# Patient Record
Sex: MALE | Race: WHITE | NOT HISPANIC OR LATINO | Employment: OTHER | URBAN - METROPOLITAN AREA
[De-identification: names, ages, dates, MRNs, and addresses within clinical notes are randomized per-mention and may not be internally consistent; named-entity substitution may affect disease eponyms.]

---

## 2017-01-31 ENCOUNTER — ALLSCRIPTS OFFICE VISIT (OUTPATIENT)
Dept: OTHER | Facility: OTHER | Age: 67
End: 2017-01-31

## 2017-02-13 RX ORDER — MULTIVITAMIN/IRON/FOLIC ACID 18MG-0.4MG
TABLET ORAL DAILY
COMMUNITY
End: 2018-04-19 | Stop reason: ALTCHOICE

## 2017-02-14 ENCOUNTER — HOSPITAL ENCOUNTER (OUTPATIENT)
Facility: MEDICAL CENTER | Age: 67
Setting detail: OUTPATIENT SURGERY
Discharge: HOME | End: 2017-02-14
Attending: INTERNAL MEDICINE | Admitting: INTERNAL MEDICINE
Payer: COMMERCIAL

## 2017-02-14 ENCOUNTER — ANESTHESIA (OUTPATIENT)
Dept: GASTROENTEROLOGY | Facility: MEDICAL CENTER | Age: 67
End: 2017-02-14
Payer: COMMERCIAL

## 2017-02-14 ENCOUNTER — ANESTHESIA EVENT (OUTPATIENT)
Dept: GASTROENTEROLOGY | Facility: MEDICAL CENTER | Age: 67
End: 2017-02-14
Payer: COMMERCIAL

## 2017-02-14 ENCOUNTER — GENERIC CONVERSION - ENCOUNTER (OUTPATIENT)
Dept: GASTROENTEROLOGY | Facility: MEDICAL CENTER | Age: 67
End: 2017-02-14

## 2017-02-14 VITALS
SYSTOLIC BLOOD PRESSURE: 112 MMHG | HEART RATE: 82 BPM | OXYGEN SATURATION: 97 % | BODY MASS INDEX: 28.92 KG/M2 | DIASTOLIC BLOOD PRESSURE: 75 MMHG | HEIGHT: 70 IN | TEMPERATURE: 98.1 F | RESPIRATION RATE: 16 BRPM | WEIGHT: 202 LBS

## 2017-02-14 DIAGNOSIS — K63.5 POLYP OF COLON: ICD-10-CM

## 2017-02-14 DIAGNOSIS — K59.00 CONSTIPATION: ICD-10-CM

## 2017-02-14 DIAGNOSIS — K62.5 HEMORRHAGE OF ANUS AND RECTUM: ICD-10-CM

## 2017-02-14 PROCEDURE — 88305 TISSUE EXAM BY PATHOLOGIST: CPT | Performed by: INTERNAL MEDICINE

## 2017-02-14 PROCEDURE — 88342 IMHCHEM/IMCYTCHM 1ST ANTB: CPT | Performed by: INTERNAL MEDICINE

## 2017-02-14 RX ORDER — SODIUM CHLORIDE 9 MG/ML
125 INJECTION, SOLUTION INTRAVENOUS CONTINUOUS
Status: DISCONTINUED | OUTPATIENT
Start: 2017-02-14 | End: 2017-02-14 | Stop reason: HOSPADM

## 2017-02-14 RX ORDER — PROPOFOL 10 MG/ML
INJECTION, EMULSION INTRAVENOUS AS NEEDED
Status: DISCONTINUED | OUTPATIENT
Start: 2017-02-14 | End: 2017-02-14 | Stop reason: SURG

## 2017-02-14 RX ADMIN — PROPOFOL 50 MG: 10 INJECTION, EMULSION INTRAVENOUS at 12:45

## 2017-02-14 RX ADMIN — SODIUM CHLORIDE 125 ML/HR: 0.9 INJECTION, SOLUTION INTRAVENOUS at 11:55

## 2017-02-14 RX ADMIN — PROPOFOL 50 MG: 10 INJECTION, EMULSION INTRAVENOUS at 12:58

## 2017-02-14 RX ADMIN — PROPOFOL 50 MG: 10 INJECTION, EMULSION INTRAVENOUS at 12:47

## 2017-02-14 RX ADMIN — PROPOFOL 50 MG: 10 INJECTION, EMULSION INTRAVENOUS at 12:54

## 2017-02-14 RX ADMIN — PROPOFOL 50 MG: 10 INJECTION, EMULSION INTRAVENOUS at 12:51

## 2017-02-14 RX ADMIN — PROPOFOL 150 MG: 10 INJECTION, EMULSION INTRAVENOUS at 12:43

## 2017-03-07 ENCOUNTER — ALLSCRIPTS OFFICE VISIT (OUTPATIENT)
Dept: OTHER | Facility: OTHER | Age: 67
End: 2017-03-07

## 2017-06-01 ENCOUNTER — TRANSCRIBE ORDERS (OUTPATIENT)
Dept: ADMINISTRATIVE | Facility: HOSPITAL | Age: 67
End: 2017-06-01

## 2017-06-01 DIAGNOSIS — H93.13 TINNITUS OF BOTH EARS: ICD-10-CM

## 2017-06-01 DIAGNOSIS — H92.02 LEFT EAR PAIN: ICD-10-CM

## 2017-06-01 DIAGNOSIS — H90.3 SENSORY HEARING LOSS, BILATERAL: Primary | ICD-10-CM

## 2017-06-08 ENCOUNTER — HOSPITAL ENCOUNTER (OUTPATIENT)
Dept: MRI IMAGING | Facility: HOSPITAL | Age: 67
Discharge: HOME | End: 2017-06-08
Attending: SPECIALIST
Payer: COMMERCIAL

## 2017-06-08 DIAGNOSIS — H93.13 TINNITUS OF BOTH EARS: ICD-10-CM

## 2017-06-08 DIAGNOSIS — H92.02 LEFT EAR PAIN: ICD-10-CM

## 2017-06-08 DIAGNOSIS — H90.3 SENSORY HEARING LOSS, BILATERAL: ICD-10-CM

## 2017-06-08 PROCEDURE — 70553 MRI BRAIN STEM W/O & W/DYE: CPT

## 2017-06-08 PROCEDURE — A9585 GADOBUTROL INJECTION: HCPCS | Performed by: SPECIALIST

## 2017-06-08 RX ADMIN — GADOBUTROL 9 ML: 604.72 INJECTION INTRAVENOUS at 09:29

## 2017-07-25 ENCOUNTER — ALLSCRIPTS OFFICE VISIT (OUTPATIENT)
Dept: OTHER | Facility: OTHER | Age: 67
End: 2017-07-25

## 2017-10-06 ENCOUNTER — TRANSCRIBE ORDERS (OUTPATIENT)
Dept: ADMINISTRATIVE | Facility: HOSPITAL | Age: 67
End: 2017-10-06

## 2017-10-06 DIAGNOSIS — M54.16 LUMBAR RADICULOPATHY: Primary | ICD-10-CM

## 2017-10-17 ENCOUNTER — HOSPITAL ENCOUNTER (OUTPATIENT)
Dept: MRI IMAGING | Facility: HOSPITAL | Age: 67
Discharge: HOME | End: 2017-10-17
Payer: COMMERCIAL

## 2017-10-17 DIAGNOSIS — M54.16 LUMBAR RADICULOPATHY: ICD-10-CM

## 2017-10-17 PROCEDURE — 72148 MRI LUMBAR SPINE W/O DYE: CPT

## 2018-01-14 VITALS
HEIGHT: 70 IN | BODY MASS INDEX: 30.1 KG/M2 | WEIGHT: 210.25 LBS | DIASTOLIC BLOOD PRESSURE: 78 MMHG | SYSTOLIC BLOOD PRESSURE: 126 MMHG | TEMPERATURE: 96.1 F | HEART RATE: 74 BPM | OXYGEN SATURATION: 95 %

## 2018-01-14 VITALS
SYSTOLIC BLOOD PRESSURE: 132 MMHG | HEIGHT: 70 IN | HEART RATE: 77 BPM | BODY MASS INDEX: 30.4 KG/M2 | DIASTOLIC BLOOD PRESSURE: 58 MMHG | OXYGEN SATURATION: 98 % | WEIGHT: 212.38 LBS | TEMPERATURE: 95.9 F

## 2018-01-14 VITALS
SYSTOLIC BLOOD PRESSURE: 116 MMHG | BODY MASS INDEX: 29.63 KG/M2 | TEMPERATURE: 97.1 F | WEIGHT: 207 LBS | OXYGEN SATURATION: 98 % | HEART RATE: 81 BPM | DIASTOLIC BLOOD PRESSURE: 80 MMHG | HEIGHT: 70 IN | RESPIRATION RATE: 18 BRPM

## 2018-03-08 RX ORDER — LISINOPRIL 2.5 MG/1
2.5 TABLET ORAL DAILY
COMMUNITY
End: 2019-02-25

## 2018-04-19 ENCOUNTER — OFFICE VISIT (OUTPATIENT)
Dept: CARDIOLOGY CLINIC | Facility: CLINIC | Age: 68
End: 2018-04-19
Payer: COMMERCIAL

## 2018-04-19 VITALS
HEART RATE: 62 BPM | WEIGHT: 196.3 LBS | BODY MASS INDEX: 28.1 KG/M2 | DIASTOLIC BLOOD PRESSURE: 62 MMHG | HEIGHT: 70 IN | SYSTOLIC BLOOD PRESSURE: 120 MMHG

## 2018-04-19 DIAGNOSIS — I34.0 NON-RHEUMATIC MITRAL REGURGITATION: ICD-10-CM

## 2018-04-19 DIAGNOSIS — I25.10 CORONARY ARTERY DISEASE INVOLVING NATIVE HEART WITHOUT ANGINA PECTORIS, UNSPECIFIED VESSEL OR LESION TYPE: Primary | ICD-10-CM

## 2018-04-19 DIAGNOSIS — I10 ESSENTIAL HYPERTENSION: ICD-10-CM

## 2018-04-19 DIAGNOSIS — R06.00 DYSPNEA, UNSPECIFIED TYPE: ICD-10-CM

## 2018-04-19 PROCEDURE — 93000 ELECTROCARDIOGRAM COMPLETE: CPT | Performed by: INTERNAL MEDICINE

## 2018-04-19 PROCEDURE — 99204 OFFICE O/P NEW MOD 45 MIN: CPT | Performed by: INTERNAL MEDICINE

## 2018-04-19 RX ORDER — AMOXICILLIN 500 MG/1
500 CAPSULE ORAL EVERY 8 HOURS SCHEDULED
COMMUNITY
End: 2019-02-25

## 2018-04-19 NOTE — PROGRESS NOTES
Cardiology Follow Up    Luis E Reyes  1950  [id number]  [address] [facility] [address]    1  Coronary artery disease involving native heart without angina pectoris, unspecified vessel or lesion type     2  Essential hypertension     3  Dyspnea, unspecified type  POCT ECG    Echo stress test w contrast if indicated    Lipid panel      History:  71-year-old male with a history of nonobstructive epicardial coronary artery disease on cardiac catheterization approximately 6 years ago in [facility] presents for evaluation of chest discomfort and 2nd opinion and overall cardiovascular care  He tolerates all activity without chest discomfort  Rhythm is within normal limits Twelve lead EKG shows normal sinus He states that he occasionally feels a funny feeling in his chest when driving or resting  It also sometimes occurs after eating  He has mild dyspnea on exertion that is chronic he denies orthopnea paroxysmal nocturnal dyspnea or syncope  Echocardiogram in 2017 showed mild concentric left ventricular hypertrophy with an ejection fraction of 45-50% and apical and septal wall motion abnormality  Apparently this was a new finding  There was also mild-to-moderate mitral regurgitation  There is no problem list on file for this patient  Past Medical History:   Diagnosis Date    Cancer [facility])     malignant neoplasm of prostate    Chronic back pain     Chronic reflux esophagitis     Disease of thyroid gland     nodule of thyroid     Gastric ulcer     Hx of bleeding disorder     rectal bleeding    Malignant neoplasm of prostate (HCC)     Polyp of colon     Rectal bleeding     Thyroid nodule      Social History     Social History    Marital status: /Civil Union     Spouse name: N/A    Number of children: N/A    Years of education: N/A     Occupational History    Not on file  Social History Main Topics    Smoking status: Never Smoker    Smokeless tobacco: Never Used    Alcohol use No    Drug use: No    Sexual activity: Not on file     Other Topics Concern    Not on file     Social History Narrative    No narrative on file      History reviewed  No pertinent family history  Past Surgical History:   Procedure Laterality Date    CERVICAL SPINE SURGERY      HERNIA REPAIR      HERNIA REPAIR      [place] COLONOSCOPY FLX DX W/COLLJ SPEC WHEN PFRMD N/A 2/14/2017    Procedure: EGD AND COLONOSCOPY;  Surgeon: Chuck Mccollum MD;  Location: [facility]; Service: Gastroenterology    PROSTATE SURGERY         Current Outpatient Prescriptions:     amoxicillin (AMOXIL) 500 mg capsule, Take 500 mg by mouth every 8 (eight) hours, Disp: , Rfl:     lisinopril (ZESTRIL) 2 5 mg tablet, Take 2 5 mg by mouth daily, Disp: , Rfl:     metoprolol tartrate (LOPRESSOR) 25 mg tablet, Take 25 mg by mouth every 12 (twelve) hours, Disp: , Rfl:   No Known Allergies    Labs:  No visits with results within 6 Month(s) from this visit  Latest known visit with results is:   Admission on 02/14/2017, Discharged on 02/14/2017   Component Date Value    Case Report 02/14/2017                      Value:Surgical Pathology Report                         Case: [id number]                                   Authorizing Provider:  Chuck Mccollum MD           Collected:           02/14/2017 1246              Ordering Location:     [facility]        Received:            02/15/2017 [address] [place] [address] Endoscopy                                                     Pathologist:           Helio Logan MD                                                           Specimen:    Stomach, Cold bx, gastritis, r/o H  Pylori                                                 Final Diagnosis 02/14/2017                      Value: This result contains rich text formatting which cannot be displayed here   Note 02/14/2017                      Value: This result contains rich text formatting which cannot be displayed here   Additional Information 02/14/2017                      Value: This result contains rich text formatting which cannot be displayed here  Verarobyn Lara Gross Description 02/14/2017                      Value: This result contains rich text formatting which cannot be displayed here  Imaging: No results found  Review of Systems:  Review of Systems   Constitutional: Positive for fatigue  HENT: Negative for nosebleeds  Eyes: Negative for redness  Respiratory: Positive for shortness of breath  Negative for chest tightness  Cardiovascular: Positive for palpitations  Negative for chest pain and leg swelling  Gastrointestinal: Negative for abdominal pain  Endocrine: Negative for polyuria  Genitourinary: Negative for urgency  Musculoskeletal: Positive for arthralgias and back pain  Skin: Negative for rash  Neurological: Positive for dizziness  Negative for syncope  Psychiatric/Behavioral: Negative for confusion and sleep disturbance  The patient is not nervous/anxious  Physical Exam:  Physical Exam   Constitutional: He is oriented to person, place, and time  He appears well-developed and well-nourished  HENT:   Head: Normocephalic and atraumatic  Nose: Nose normal    Mouth/Throat: Oropharynx is clear and moist    Eyes: Pupils are equal, round, and reactive to light  Neck: Neck supple  Cardiovascular: Normal rate, regular rhythm and intact distal pulses  Murmur heard  Systolic murmur is present with a grade of 2/6   Pulmonary/Chest: Effort normal and breath sounds normal    Abdominal: Soft  Bowel sounds are normal    Musculoskeletal: Normal range of motion  Neurological: He is alert and oriented to person, place, and time  Skin: Skin is warm and dry  Psychiatric: He has a normal mood and affect   His behavior is normal  Judgment and thought content normal        Discussion/Summary:  History of nonobstructive coronary artery disease  Atypical chest pain symptoms  Also with known esophageal disease and erosive gastritis on EGD last year  Had an echocardiogram that showed apical and septal wall motion abnormality last year  Currently he is on metoprolol lisinopril  I will order a stress echocardiogram to assess functional capacity re-evaluate LV function and screen for ischemia  I will also check a fasting lipid profile  I will see him back to review these findings and make further recommendations

## 2018-05-14 ENCOUNTER — HOSPITAL ENCOUNTER (OUTPATIENT)
Dept: NON INVASIVE DIAGNOSTICS | Facility: CLINIC | Age: 68
Discharge: HOME | End: 2018-05-14
Payer: COMMERCIAL

## 2018-05-14 ENCOUNTER — APPOINTMENT (OUTPATIENT)
Dept: LAB | Facility: CLINIC | Age: 68
End: 2018-05-14
Payer: COMMERCIAL

## 2018-05-14 DIAGNOSIS — R06.00 DYSPNEA, UNSPECIFIED TYPE: ICD-10-CM

## 2018-05-14 PROCEDURE — 93350 STRESS TTE ONLY: CPT

## 2018-05-15 LAB
MAX DIASTOLIC BP: 76 MMHG
MAX HEART RATE: 151 BPM
MAX PREDICTED HEART RATE: 153 BPM
MAX. SYSTOLIC BP: 184 MMHG
PROTOCOL NAME: NORMAL
TARGET HR FORMULA: NORMAL
TEST INDICATION: NORMAL
TIME IN EXERCISE PHASE: NORMAL

## 2018-05-15 PROCEDURE — 93351 STRESS TTE COMPLETE: CPT | Performed by: INTERNAL MEDICINE

## 2018-05-21 ENCOUNTER — APPOINTMENT (OUTPATIENT)
Dept: LAB | Facility: HOSPITAL | Age: 68
End: 2018-05-21
Attending: INTERNAL MEDICINE
Payer: COMMERCIAL

## 2018-05-21 LAB
CHOLEST SERPL-MCNC: 184 MG/DL (ref 50–200)
HDLC SERPL-MCNC: 87 MG/DL (ref 40–60)
LDLC SERPL CALC-MCNC: 93 MG/DL (ref 0–100)
NONHDLC SERPL-MCNC: 97 MG/DL
TRIGL SERPL-MCNC: 20 MG/DL

## 2018-05-21 PROCEDURE — 80061 LIPID PANEL: CPT | Performed by: INTERNAL MEDICINE

## 2018-05-21 PROCEDURE — 36415 COLL VENOUS BLD VENIPUNCTURE: CPT | Performed by: INTERNAL MEDICINE

## 2018-05-29 RX ORDER — ACETAMINOPHEN AND CODEINE PHOSPHATE 300; 30 MG/1; MG/1
TABLET ORAL
COMMUNITY
Start: 2018-04-18 | End: 2019-02-25

## 2018-05-29 RX ORDER — IBUPROFEN 600 MG/1
TABLET ORAL
COMMUNITY
Start: 2018-04-18 | End: 2019-02-25

## 2018-05-31 ENCOUNTER — OFFICE VISIT (OUTPATIENT)
Dept: CARDIOLOGY CLINIC | Facility: CLINIC | Age: 68
End: 2018-05-31
Payer: COMMERCIAL

## 2018-05-31 VITALS
OXYGEN SATURATION: 98 % | HEIGHT: 70 IN | HEART RATE: 67 BPM | SYSTOLIC BLOOD PRESSURE: 120 MMHG | DIASTOLIC BLOOD PRESSURE: 80 MMHG | BODY MASS INDEX: 27.5 KG/M2 | WEIGHT: 192.1 LBS

## 2018-05-31 DIAGNOSIS — R07.9 CHEST PAIN, UNSPECIFIED TYPE: ICD-10-CM

## 2018-05-31 DIAGNOSIS — I10 ESSENTIAL HYPERTENSION: ICD-10-CM

## 2018-05-31 DIAGNOSIS — I25.10 CORONARY ARTERY DISEASE INVOLVING NATIVE CORONARY ARTERY OF NATIVE HEART WITHOUT ANGINA PECTORIS: Primary | ICD-10-CM

## 2018-05-31 PROCEDURE — 99214 OFFICE O/P EST MOD 30 MIN: CPT | Performed by: INTERNAL MEDICINE

## 2018-05-31 RX ORDER — MULTIVITAMIN
1 TABLET ORAL DAILY
COMMUNITY
End: 2019-02-25

## 2018-05-31 RX ORDER — ASPIRIN 81 MG/1
81 TABLET, CHEWABLE ORAL DAILY
COMMUNITY

## 2018-05-31 NOTE — PROGRESS NOTES
Cardiology Follow Up    [place] Route  1950  [id number]  [address] [facility] [place]  [address] [facility] [address]    1  Coronary artery disease involving native coronary artery of native heart without angina pectoris     2  Chest pain, unspecified type     3  Essential hypertension         Interval History:  He had presented last month with atypical chest pain and mild dyspnea on exertion  The dyspnea is improved  He has occasional stabbing chest pain that comes and goes spontaneously occur which can occur with activity and rest   It only lasts seconds  Iraheta he has no complaints today he denies orthopnea paroxysmal nocturnal dyspnea or syncope    There is no problem list on file for this patient  Past Medical History:   Diagnosis Date    Cancer [facility])     malignant neoplasm of prostate    Chronic back pain     Chronic reflux esophagitis     Disease of thyroid gland     nodule of thyroid     Gastric ulcer     Hx of bleeding disorder     rectal bleeding    Malignant neoplasm of prostate (HCC)     Polyp of colon     Rectal bleeding     Thyroid nodule      Social History     Social History    Marital status: /Civil Union     Spouse name: N/A    Number of children: N/A    Years of education: N/A     Occupational History    Not on file  Social History Main Topics    Smoking status: Never Smoker    Smokeless tobacco: Never Used    Alcohol use No    Drug use: No    Sexual activity: Not on file     Other Topics Concern    Not on file     Social History Narrative    No narrative on file      No family history on file    Past Surgical History:   Procedure Laterality Date    CERVICAL SPINE SURGERY      HERNIA REPAIR      HERNIA REPAIR      [place] COLONOSCOPY FLX DX W/COLLJ SPEC WHEN PFRMD N/A 2/14/2017    Procedure: EGD AND COLONOSCOPY;  Surgeon: Rakesh Reynoso MD;  Location: [facility]; Service: Gastroenterology    PROSTATE SURGERY         Current Outpatient Prescriptions:     acetaminophen-codeine (TYLENOL #3) 300-30 mg per tablet, , Disp: , Rfl:     aspirin 81 mg chewable tablet, Chew 81 mg daily, Disp: , Rfl:     ibuprofen (MOTRIN) 600 mg tablet, , Disp: , Rfl:     lisinopril (ZESTRIL) 2 5 mg tablet, Take 2 5 mg by mouth daily, Disp: , Rfl:     metoprolol tartrate (LOPRESSOR) 25 mg tablet, Take 25 mg by mouth every 12 (twelve) hours, Disp: , Rfl:     Multiple Vitamin (MULTIVITAMIN) tablet, Take 1 tablet by mouth daily, Disp: , Rfl:     amoxicillin (AMOXIL) 500 mg capsule, Take 500 mg by mouth every 8 (eight) hours, Disp: , Rfl:   No Known Allergies    Labs:not applicable  Imaging: No results found  Review of Systems:  Review of Systems   Constitutional: Negative for fatigue  HENT: Negative for nosebleeds  Eyes: Negative for redness  Respiratory: Negative for chest tightness and shortness of breath  Cardiovascular: Positive for chest pain  Negative for palpitations and leg swelling  Gastrointestinal: Negative for abdominal pain  Endocrine: Negative for polyuria  Genitourinary: Negative for urgency  Musculoskeletal: Negative for arthralgias  Skin: Negative for rash  Neurological: Negative for dizziness and syncope  Psychiatric/Behavioral: Negative for confusion and sleep disturbance  The patient is not nervous/anxious  Physical Exam:  Physical Exam   Constitutional: He is oriented to person, place, and time  He appears well-developed and well-nourished  HENT:   Head: Normocephalic and atraumatic  Nose: Nose normal    Mouth/Throat: Oropharynx is clear and moist    Eyes: Pupils are equal, round, and reactive to light  Neck: Neck supple  Cardiovascular: Normal rate, regular rhythm and intact distal pulses  Murmur heard  Systolic murmur is present with a grade of 1/6   Pulmonary/Chest: Effort normal and breath sounds normal    Abdominal: Soft  Bowel sounds are normal    Musculoskeletal: Normal range of motion  Neurological: He is alert and oriented to person, place, and time  Skin: Skin is warm and dry  Psychiatric: He has a normal mood and affect  His behavior is normal  Judgment and thought content normal        Discussion/Summary:  He had stress echocardiography done  He exercised 8 minutes on a Parker protocol  Echocardiography at peak exercise showed normal augmentation of all wall segments and the echocardiographic portion was negative for ischemia  Baseline EF was 55%  I told him his atypical chest pain syndrome is likely noncardiac  His blood pressure is currently controlled on lisinopril and metoprolol  I checked a fasting lipid profile which was excellent with an HDL of 83 and an LDL of less than 100    I will see him again in 1 year

## 2018-07-20 ENCOUNTER — TELEPHONE (OUTPATIENT)
Dept: CARDIOLOGY CLINIC | Facility: CLINIC | Age: 68
End: 2018-07-20

## 2018-07-20 NOTE — TELEPHONE ENCOUNTER
Nancy Parmar called to let you know that he was at [facility] this week on 7/17 for OP foot surgery, and after getting the sedation, his HR went down to 30  The surgery was completed and he was sent to ER and kept at hospital for observation for 24 hours  He had an echo done there that he was told was normal, and no med changes were made  He was told it was probably a reaction to the sedation  He wanted to let you know  Not due for f/u until May, 2019    Pt cb [phone]

## 2019-02-25 ENCOUNTER — OFFICE VISIT (OUTPATIENT)
Dept: GASTROENTEROLOGY | Facility: MEDICAL CENTER | Age: 69
End: 2019-02-25
Payer: COMMERCIAL

## 2019-02-25 VITALS
TEMPERATURE: 98 F | HEIGHT: 70 IN | WEIGHT: 208.4 LBS | DIASTOLIC BLOOD PRESSURE: 84 MMHG | SYSTOLIC BLOOD PRESSURE: 132 MMHG | BODY MASS INDEX: 29.84 KG/M2 | HEART RATE: 71 BPM

## 2019-02-25 DIAGNOSIS — R10.84 GENERALIZED ABDOMINAL PAIN: ICD-10-CM

## 2019-02-25 DIAGNOSIS — K21.9 GASTROESOPHAGEAL REFLUX DISEASE WITHOUT ESOPHAGITIS: Primary | ICD-10-CM

## 2019-02-25 PROCEDURE — 99214 OFFICE O/P EST MOD 30 MIN: CPT | Performed by: INTERNAL MEDICINE

## 2019-02-25 RX ORDER — DIPHENOXYLATE HYDROCHLORIDE AND ATROPINE SULFATE 2.5; .025 MG/1; MG/1
1 TABLET ORAL
COMMUNITY
End: 2019-03-07 | Stop reason: SDUPTHER

## 2019-02-25 RX ORDER — OMEPRAZOLE 20 MG/1
20 CAPSULE, DELAYED RELEASE ORAL DAILY
Qty: 30 CAPSULE | Refills: 5 | Status: SHIPPED | OUTPATIENT
Start: 2019-02-25 | End: 2019-03-07 | Stop reason: CLARIF

## 2019-02-25 NOTE — PROGRESS NOTES
[facility] - Outpatient Follow-up Note  Christy Ear 80 y o  male MRN: [id number]  Encounter: [id number]          ASSESSMENT AND PLAN:      1  Gastroesophageal reflux disease without esophagitis  2  Generalized abdominal pain  I suspect his recent symptoms may have been due to a viral gastroenteritis causing dyspeptic symptoms and perhaps a worsening of his reflux  Another possibility is that his reflux may have worsened and he has again developed reflux esophagitis  Less likely would be gallstones  I will refer him for right upper quadrant ultrasound and start him back on omeprazole 20 milligrams by mouth daily  I will have a follow-up in the office in two months if there is no improvement in his symptoms we may consider repeat endoscopy or CT scan  - US abdomen limited; Future  - omeprazole (PriLOSEC) 20 mg delayed release capsule; Take 1 capsule (20 mg total) by mouth daily  Dispense: 30 capsule; Refill: 5    ______________________________________________________________________    SUBJECTIVE:  He presents for evaluation because of his chronic reflux that has worsened over the past two weeks and new onset of generalized abdominal pain over the past two weeks  He denies any nausea, vomiting, change in bowel habits, bleeding, or weight loss  His last upper endoscopy and colonoscopy in 2017 revealed erosive reflux esophagitis, diverticulosis, gastritis, and hemorrhoids  He was started on omeprazole and felt it helped but then stopped it because he was feeling better  REVIEW OF SYSTEMS IS OTHERWISE NEGATIVE        Historical Information   Past Medical History:   Diagnosis Date    Cancer [facility])     malignant neoplasm of prostate    Chronic back pain     Chronic reflux esophagitis     Disease of thyroid gland     nodule of thyroid     Gastric ulcer     Hx of bleeding disorder     rectal bleeding    Malignant neoplasm of prostate (HCC)     Polyp of colon     Rectal bleeding     Thyroid nodule      Past Surgical History:   Procedure Laterality Date    CERVICAL SPINE SURGERY      FOOT SURGERY Bilateral     HERNIA REPAIR      HERNIA REPAIR      [place] COLONOSCOPY FLX DX W/COLLJ SPEC WHEN PFRMD N/A 2/14/2017    Procedure: EGD AND COLONOSCOPY;  Surgeon: Raj Gary MD;  Location: [facility] GI [facility]; Service: Gastroenterology    PROSTATE SURGERY       Social History   Social History     Substance and Sexual Activity   Alcohol Use No     Social History     Substance and Sexual Activity   Drug Use No     Social History     Tobacco Use   Smoking Status Never Smoker   Smokeless Tobacco Never Used     History reviewed  No pertinent family history  Meds/Allergies       Current Outpatient Medications:     aspirin 81 mg chewable tablet    multivitamin (THERAGRAN) TABS    omeprazole (PriLOSEC) 20 mg delayed release capsule    No Known Allergies        Objective     Blood pressure 132/84, pulse 71, temperature 98 °F (36 7 °C), temperature source Tympanic, height 5' 10" (1 778 m), weight 94 5 kg (208 lb 6 4 oz)  Body mass index is 29 9 kg/m²  PHYSICAL EXAM:      General Appearance:   Alert, cooperative, no distress   HEENT:   Normocephalic, atraumatic, anicteric      Neck:  Supple, symmetrical, trachea midline   Lungs:   Clear to auscultation bilaterally; no rales, rhonchi or wheezing; respirations unlabored    Heart[de-identified]   Regular rate and rhythm; no murmur, rub, or gallop  Abdomen:   Soft, mild periumbilical tenderness, non-distended; normal bowel sounds; no masses, no organomegaly    Genitalia:   Deferred    Rectal:   Deferred    Extremities:  No cyanosis, clubbing or edema    Pulses:  2+ and symmetric    Skin:  No jaundice, rashes, or lesions    Lymph nodes:  No palpable cervical lymphadenopathy        Lab Results:   No visits with results within 1 Day(s) from this visit     Latest known visit with results is:   Hospital Outpatient Visit on 05/14/2018   Component Date Value    Protocol Name 05/14/2018 Karyna Garvin Time In Exercise Phase 05/14/2018 00:08:00     MAX  SYSTOLIC BP 34/29/1060 799     Max Diastolic Bp 51/53/0643 76     Max Heart Rate 05/14/2018 151     Max Predicted Heart Rate 05/14/2018 153     Reason for Termination 05/14/2018                      Value:Target Heart Rate Achieved  Dyspnea/ SOB  Fatigue      Test Indication 05/14/2018 Dyspnea     Target Hr Formular 05/14/2018 (220 - Age)*85%          Radiology Results:   No results found

## 2019-03-03 ENCOUNTER — HOSPITAL ENCOUNTER (OUTPATIENT)
Dept: ULTRASOUND IMAGING | Facility: HOSPITAL | Age: 69
Discharge: HOME | End: 2019-03-03
Attending: INTERNAL MEDICINE
Payer: COMMERCIAL

## 2019-03-03 DIAGNOSIS — R10.84 GENERALIZED ABDOMINAL PAIN: ICD-10-CM

## 2019-03-03 PROCEDURE — 76705 ECHO EXAM OF ABDOMEN: CPT

## 2019-03-06 ENCOUNTER — TELEPHONE (OUTPATIENT)
Dept: GASTROENTEROLOGY | Facility: CLINIC | Age: 69
End: 2019-03-06

## 2019-03-06 DIAGNOSIS — R16.0 LIVER MASS: Primary | ICD-10-CM

## 2019-03-06 RX ORDER — MECLIZINE HYDROCHLORIDE 25 MG/1
TABLET ORAL
COMMUNITY
Start: 2018-12-06 | End: 2019-03-07 | Stop reason: CLARIF

## 2019-03-06 RX ORDER — SUMATRIPTAN 25 MG/1
TABLET, FILM COATED ORAL
COMMUNITY
Start: 2018-12-06 | End: 2019-03-07 | Stop reason: CLARIF

## 2019-03-06 RX ORDER — MELOXICAM 7.5 MG/1
TABLET ORAL
COMMUNITY
Start: 2018-12-04 | End: 2019-03-07 | Stop reason: CLARIF

## 2019-03-06 RX ORDER — METOPROLOL SUCCINATE 25 MG/1
TABLET, EXTENDED RELEASE ORAL
COMMUNITY
Start: 2019-03-01 | End: 2019-03-07 | Stop reason: CLARIF

## 2019-03-06 RX ORDER — ATORVASTATIN CALCIUM 40 MG/1
TABLET, FILM COATED ORAL
COMMUNITY
Start: 2019-03-01 | End: 2019-03-07 | Stop reason: CLARIF

## 2019-03-06 RX ORDER — ALBUTEROL SULFATE 90 UG/1
AEROSOL, METERED RESPIRATORY (INHALATION)
COMMUNITY
Start: 2018-12-06 | End: 2019-03-07 | Stop reason: CLARIF

## 2019-03-06 RX ORDER — TADALAFIL 5 MG/1
TABLET ORAL
COMMUNITY

## 2019-03-06 RX ORDER — ONDANSETRON 4 MG/1
TABLET, FILM COATED ORAL
COMMUNITY
Start: 2018-11-26 | End: 2019-03-07 | Stop reason: CLARIF

## 2019-03-06 NOTE — RESULT ENCOUNTER NOTE
I called him with his results and left a voicemail for him to call me back  Likely a cavernous hemangioma, but will get triple phase CT to confirm

## 2019-03-06 NOTE — TELEPHONE ENCOUNTER
Saintclair Levee Dr Rodolfo Croissant,  I was just about to call Corey Shaver  I saw you ordered his CT scan  Do you need me to relay results to him or did you already speak with him?   Jermain

## 2019-03-06 NOTE — TELEPHONE ENCOUNTER
[facility] pt    Radiology dept called to report significant findings on the pts US results  Tiger text sent

## 2019-03-07 ENCOUNTER — OFFICE VISIT (OUTPATIENT)
Dept: CARDIOLOGY CLINIC | Facility: CLINIC | Age: 69
End: 2019-03-07
Payer: COMMERCIAL

## 2019-03-07 VITALS
BODY MASS INDEX: 30.61 KG/M2 | HEIGHT: 70 IN | SYSTOLIC BLOOD PRESSURE: 138 MMHG | WEIGHT: 213.8 LBS | OXYGEN SATURATION: 98 % | DIASTOLIC BLOOD PRESSURE: 78 MMHG | HEART RATE: 80 BPM

## 2019-03-07 DIAGNOSIS — I10 ESSENTIAL HYPERTENSION: Primary | ICD-10-CM

## 2019-03-07 PROCEDURE — 99215 OFFICE O/P EST HI 40 MIN: CPT | Performed by: NURSE PRACTITIONER

## 2019-03-07 NOTE — PROGRESS NOTES
Cardiology Office Visit   Trino Lutz 80 y o  male MRN: [id number]    HPI  Mr Zack Hale is a 80year old male with a known past medical history of   Patient Active Problem List   Diagnosis    Generalized abdominal pain    Gastroesophageal reflux disease without esophagitis     Mr Zack Hale was seen by Dr Leslie Lobo, PCP, for pre operative clearance  Bebe Huang will be undergoing right foot surgery in the near future  Bebe Huang was found to have and elevated /90, at this office visit  He was instructed to follow up with Cardiology  Today Bebe Huang presents to our office for elevated BP  He is accompanied by his wife  Bebe Huang denies CP, palpitations, dyspnea with minimal or moderate exertion, lightheadedness or dizziness  He admits to ocasional ringing in his ears  He does not take his BP at home  He has gained weight over the winter from calories and has not been exercising  Review of Systems   HENT: Positive for tinnitus  Musculoskeletal: Positive for joint pain  Right foot         Objective:   Vitals: /90, /86  Vitals:    03/07/19 0826   BP: 138/78   BP Location: Right arm   Patient Position: Sitting   Cuff Size: Large   Pulse: 80   SpO2: 98%   Weight: 97 kg (213 lb 12 8 oz)   Height: 5' 10" (1 778 m)     Body mass index is 30 68 kg/m²      Wt Readings from Last 3 Encounters:   02/25/19 94 5 kg (208 lb 6 4 oz)   05/31/18 87 1 kg (192 lb 1 6 oz)   04/19/18 89 kg (196 lb 4 8 oz)         Physical Exam:  GEN: Geena Dyer appears well, alert and oriented x 3, pleasant and cooperative   HEENT: pupils equal, round, and reactive to light; extraocular muscles intact  NECK: supple, no carotid bruits   HEART: regular rhythm, normal S1 and S2, no murmurs, clicks, gallops or rubs    LUNGS: clear to auscultation bilaterally; no wheezes, rales, or rhonchi   ABDOMEN: normal bowel sounds, soft, no tenderness, no distention  EXTREMITIES: peripheral pulses normal; no clubbing, cyanosis, or edema  NEURO: no focal findings   SKIN: normal without suspicious lesions on exposed skin      Current Outpatient Medications:     aspirin 81 mg chewable tablet, Chew 81 mg daily, Disp: , Rfl:     Aspirin-Calcium Carbonate  MG TABS, Take 81 mg by mouth, Disp: , Rfl:     atorvastatin (LIPITOR) 40 mg tablet, , Disp: , Rfl:     meclizine (ANTIVERT) 25 mg tablet,  , Disp: , Rfl:     meloxicam (MOBIC) 7 5 mg tablet,  , Disp: , Rfl:     metoprolol succinate (TOPROL-XL) 25 mg 24 hr tablet, , Disp: , Rfl:     Multiple Vitamin (THERAPEUTIC MULTIVITAMIN PO), Take 1 tablet by mouth, Disp: , Rfl:     multivitamin (THERAGRAN) TABS, Take 1 tablet by mouth, Disp: , Rfl:     omeprazole (PriLOSEC) 20 mg delayed release capsule, Take 1 capsule (20 mg total) by mouth daily, Disp: 30 capsule, Rfl: 5    ondansetron (ZOFRAN) 4 mg tablet,  , Disp: , Rfl:     SUMAtriptan (IMITREX) 25 mg tablet,  , Disp: , Rfl:     tadalafil (CIALIS) 5 MG tablet, daily  , Disp: , Rfl:       Assessment/Plan:   1  HTN- BP in left arm 130/86 and Right arm 140/90- instructed on 2gm sodium diet, reading food labels consuming foods 10% or less per serving of sodium, daily exercise  Home BP monitoring and keeping a log  Intrusted to bring BP log to follow up office visit in 3 months with Dr Maco Mtz  Fasting Lipid profile for pre to be done prior to follow up visit        The Gayla Prasad  3/7/2019  8:23 AM

## 2019-03-07 NOTE — PATIENT INSTRUCTIONS
2gm sodium, low fat, low cholesterol diet    Home BP monitoring, sit for 5 minutes prior to taking BP, keep a log, bring to follow up visit

## 2019-03-11 ENCOUNTER — APPOINTMENT (OUTPATIENT)
Dept: LAB | Facility: HOSPITAL | Age: 69
End: 2019-03-11
Payer: COMMERCIAL

## 2019-03-11 ENCOUNTER — TELEPHONE (OUTPATIENT)
Dept: GASTROENTEROLOGY | Facility: CLINIC | Age: 69
End: 2019-03-11

## 2019-03-11 DIAGNOSIS — K76.9 LIVER LESION: Primary | ICD-10-CM

## 2019-03-11 LAB
CHOLEST SERPL-MCNC: 218 MG/DL (ref 50–200)
HDLC SERPL-MCNC: 74 MG/DL (ref 40–60)
LDLC SERPL CALC-MCNC: 127 MG/DL (ref 0–100)
NONHDLC SERPL-MCNC: 144 MG/DL
TRIGL SERPL-MCNC: 86 MG/DL

## 2019-03-11 PROCEDURE — 80061 LIPID PANEL: CPT | Performed by: NURSE PRACTITIONER

## 2019-03-11 PROCEDURE — 36415 COLL VENOUS BLD VENIPUNCTURE: CPT | Performed by: NURSE PRACTITIONER

## 2019-03-11 NOTE — TELEPHONE ENCOUNTER
Prior authorization for cpt [id number] was started through [facility]  Prior auth for the ct of pelvis was denied  Please call [facility] @ [phone] to complete a peer to peer  Patient is scheduled 3/13/19  Just provide patient's ID # which is [id number]  Thank you!

## 2019-03-12 DIAGNOSIS — K76.9 LIVER LESION: Primary | ICD-10-CM

## 2019-03-12 DIAGNOSIS — R16.0 LIVER MASS: Primary | ICD-10-CM

## 2019-03-12 NOTE — TELEPHONE ENCOUNTER
Called [facility] to confirm the status, spoke with Ruth Ann  She states when you called yesterday you only did a peer to peer for a CT of abdomen  The patient call I sent to you was for a peer to peer for cpt [phone], ct of the abdomen and pelvis  Patient is scheduled for tomorrow, please advise

## 2019-03-12 NOTE — TELEPHONE ENCOUNTER
Can you enter the order for the CT of the abdomen so they do not do both the pelvis and abdomen tomorrow?

## 2019-03-13 ENCOUNTER — TELEPHONE (OUTPATIENT)
Dept: GASTROENTEROLOGY | Facility: AMBULARY SURGERY CENTER | Age: 69
End: 2019-03-13

## 2019-03-13 NOTE — TELEPHONE ENCOUNTER
DR Gisella Vazquez' SPT    T [facility] radiology called to notify the office of pt no show for today's appt

## 2019-03-21 ENCOUNTER — HOSPITAL ENCOUNTER (OUTPATIENT)
Dept: CT IMAGING | Facility: HOSPITAL | Age: 69
Discharge: HOME | End: 2019-03-21
Payer: COMMERCIAL

## 2019-03-21 DIAGNOSIS — K76.9 LIVER LESION: ICD-10-CM

## 2019-03-21 PROCEDURE — 74160 CT ABDOMEN W/CONTRAST: CPT

## 2019-03-21 RX ADMIN — IOHEXOL 100 ML: 350 INJECTION, SOLUTION INTRAVENOUS at 08:09

## 2019-04-25 ENCOUNTER — OFFICE VISIT (OUTPATIENT)
Dept: GASTROENTEROLOGY | Facility: MEDICAL CENTER | Age: 69
End: 2019-04-25
Payer: COMMERCIAL

## 2019-04-25 VITALS
HEIGHT: 70 IN | HEART RATE: 67 BPM | BODY MASS INDEX: 30.64 KG/M2 | DIASTOLIC BLOOD PRESSURE: 80 MMHG | SYSTOLIC BLOOD PRESSURE: 120 MMHG | TEMPERATURE: 97.8 F | WEIGHT: 214 LBS

## 2019-04-25 DIAGNOSIS — K76.9 LIVER LESION: ICD-10-CM

## 2019-04-25 DIAGNOSIS — K21.9 GASTROESOPHAGEAL REFLUX DISEASE WITHOUT ESOPHAGITIS: Primary | ICD-10-CM

## 2019-04-25 DIAGNOSIS — Z12.11 COLON CANCER SCREENING: ICD-10-CM

## 2019-04-25 PROBLEM — R10.84 GENERALIZED ABDOMINAL PAIN: Status: RESOLVED | Noted: 2019-02-25 | Resolved: 2019-04-25

## 2019-04-25 PROCEDURE — 99214 OFFICE O/P EST MOD 30 MIN: CPT | Performed by: INTERNAL MEDICINE

## 2019-05-29 RX ORDER — MELOXICAM 7.5 MG/1
TABLET ORAL
COMMUNITY
Start: 2018-12-04

## 2019-05-29 RX ORDER — ONDANSETRON 4 MG/1
TABLET, FILM COATED ORAL
COMMUNITY
Start: 2018-11-26

## 2019-05-29 RX ORDER — MECLIZINE HYDROCHLORIDE 25 MG/1
TABLET ORAL
COMMUNITY
Start: 2018-12-06

## 2019-05-29 RX ORDER — SUMATRIPTAN 25 MG/1
TABLET, FILM COATED ORAL
COMMUNITY
Start: 2018-12-06

## 2019-05-29 RX ORDER — ALBUTEROL SULFATE 90 UG/1
AEROSOL, METERED RESPIRATORY (INHALATION)
COMMUNITY
Start: 2018-12-06

## 2019-05-30 ENCOUNTER — OFFICE VISIT (OUTPATIENT)
Dept: CARDIOLOGY CLINIC | Facility: CLINIC | Age: 69
End: 2019-05-30
Payer: COMMERCIAL

## 2019-05-30 VITALS
WEIGHT: 217.4 LBS | DIASTOLIC BLOOD PRESSURE: 82 MMHG | SYSTOLIC BLOOD PRESSURE: 120 MMHG | HEART RATE: 74 BPM | HEIGHT: 70 IN | BODY MASS INDEX: 31.12 KG/M2

## 2019-05-30 DIAGNOSIS — I10 ESSENTIAL HYPERTENSION: Primary | ICD-10-CM

## 2019-05-30 DIAGNOSIS — I25.10 CORONARY ARTERY DISEASE INVOLVING NATIVE CORONARY ARTERY OF NATIVE HEART WITHOUT ANGINA PECTORIS: ICD-10-CM

## 2019-05-30 DIAGNOSIS — I49.3 PVC (PREMATURE VENTRICULAR CONTRACTION): ICD-10-CM

## 2019-05-30 PROCEDURE — 93000 ELECTROCARDIOGRAM COMPLETE: CPT | Performed by: INTERNAL MEDICINE

## 2019-05-30 PROCEDURE — 99214 OFFICE O/P EST MOD 30 MIN: CPT | Performed by: INTERNAL MEDICINE

## 2020-01-14 ENCOUNTER — OFFICE VISIT (OUTPATIENT)
Dept: GASTROENTEROLOGY | Facility: MEDICAL CENTER | Age: 70
End: 2020-01-14
Payer: COMMERCIAL

## 2020-01-14 VITALS
TEMPERATURE: 97.8 F | HEIGHT: 70 IN | SYSTOLIC BLOOD PRESSURE: 131 MMHG | WEIGHT: 217 LBS | DIASTOLIC BLOOD PRESSURE: 76 MMHG | HEART RATE: 70 BPM | BODY MASS INDEX: 31.07 KG/M2

## 2020-01-14 DIAGNOSIS — K76.9 LIVER LESION: ICD-10-CM

## 2020-01-14 DIAGNOSIS — Z12.11 COLON CANCER SCREENING: ICD-10-CM

## 2020-01-14 DIAGNOSIS — K21.9 GASTROESOPHAGEAL REFLUX DISEASE WITHOUT ESOPHAGITIS: Primary | ICD-10-CM

## 2020-01-14 PROCEDURE — 99214 OFFICE O/P EST MOD 30 MIN: CPT | Performed by: INTERNAL MEDICINE

## 2020-01-14 NOTE — PROGRESS NOTES
[facility] - Outpatient Follow-up Note  Sangita Harding 77 y o  male MRN: [id number]  Encounter: [id number]          ASSESSMENT AND PLAN:      1  Gastroesophageal reflux disease without esophagitis  I encouraged him to continue the diet and lifestyle modifications detailed on his handout and said he can take Pepcid as needed for breakthrough symptoms  I suspect his episode of nausea and vomiting was due to a viral gastroenteritis since it resolved on its own  I asked him to let me know if the symptoms recur  2  Liver lesion  He most likely has a small liver hemangioma or cyst and no further workup for this is indicated  3  Colon cancer screening  He is due for his next screening colonoscopy in 2022 or sooner if he develops new symptoms  ______________________________________________________________________    SUBJECTIVE:  He presents for follow-up of his reflux, liver hemangioma, and colon cancer screening  He was able to stop his omeprazole and has not had any significant recurrence of reflux symptoms  He had a brief episode a few weeks ago of nausea and vomiting but that resolved on its own  He has not had any abdominal pain, change in bowel habits, bleeding, or weight loss  REVIEW OF SYSTEMS IS OTHERWISE NEGATIVE        Historical Information   Past Medical History:   Diagnosis Date    Cancer [facility])     malignant neoplasm of prostate    Chronic back pain     Chronic reflux esophagitis     Disease of thyroid gland     nodule of thyroid     Gastric ulcer     HTN (hypertension)     Hx of bleeding disorder     rectal bleeding    Malignant neoplasm of prostate (HCC)     Polyp of colon     Rectal bleeding     Thyroid nodule      Past Surgical History:   Procedure Laterality Date    CERVICAL SPINE SURGERY      FOOT SURGERY Bilateral     HERNIA REPAIR      HERNIA REPAIR      [place] COLONOSCOPY FLX DX W/COLLJ SPEC WHEN PFRMD N/A 2/14/2017    Procedure: EGD AND COLONOSCOPY;  Surgeon: Judith De Leon MD;  Location: [facility] GI [facility]; Service: Gastroenterology    PROSTATE SURGERY       Social History   Social History     Substance and Sexual Activity   Alcohol Use No     Social History     Substance and Sexual Activity   Drug Use No     Social History     Tobacco Use   Smoking Status Never Smoker   Smokeless Tobacco Never Used     No family history on file  Meds/Allergies       Current Outpatient Medications:     aspirin 81 mg chewable tablet    Multiple Vitamin (THERAPEUTIC MULTIVITAMIN PO)    tadalafil (CIALIS) 5 MG tablet    albuterol (PROAIR HFA) 90 mcg/act inhaler    meclizine (ANTIVERT) 25 mg tablet    meloxicam (MOBIC) 7 5 mg tablet    ondansetron (ZOFRAN) 4 mg tablet    SUMAtriptan (IMITREX) 25 mg tablet    No Known Allergies        Objective     Blood pressure 131/76, pulse 70, temperature 97 8 °F (36 6 °C), temperature source Tympanic, height 5' 10" (1 778 m), weight 98 4 kg (217 lb)  Body mass index is 31 14 kg/m²  PHYSICAL EXAM:      General Appearance:   Alert, cooperative, no distress   HEENT:   Normocephalic, atraumatic, anicteric      Neck:  Supple, symmetrical, trachea midline   Lungs:   Clear to auscultation bilaterally; no rales, rhonchi or wheezing; respirations unlabored    Heart[de-identified]   Regular rate and rhythm; no murmur, rub, or gallop  Abdomen:   Soft, non-tender, non-distended; normal bowel sounds; no masses, no organomegaly    Genitalia:   Deferred    Rectal:   Deferred    Extremities:  No cyanosis, clubbing or edema    Pulses:  2+ and symmetric    Skin:  No jaundice, rashes, or lesions    Lymph nodes:  No palpable cervical lymphadenopathy        Lab Results:   No visits with results within 1 Day(s) from this visit     Latest known visit with results is:   Office Visit on 03/07/2019   Component Date Value    Cholesterol 03/11/2019 218*    Triglycerides 03/11/2019 86     HDL, Direct 03/11/2019 74*    LDL Calculated 03/11/2019 127*    Non-HDL-Chol (CHOL-HDL) 03/11/2019 144          Radiology Results:   No results found

## 2020-05-14 ENCOUNTER — TELEMEDICINE (OUTPATIENT)
Dept: CARDIOLOGY CLINIC | Facility: CLINIC | Age: 70
End: 2020-05-14
Payer: COMMERCIAL

## 2020-05-14 DIAGNOSIS — I25.10 CORONARY ARTERY DISEASE INVOLVING NATIVE CORONARY ARTERY OF NATIVE HEART WITHOUT ANGINA PECTORIS: Primary | ICD-10-CM

## 2020-05-14 DIAGNOSIS — I34.0 NONRHEUMATIC MITRAL VALVE REGURGITATION: ICD-10-CM

## 2020-05-14 DIAGNOSIS — E78.2 MIXED HYPERLIPIDEMIA: ICD-10-CM

## 2020-05-14 PROCEDURE — 99213 OFFICE O/P EST LOW 20 MIN: CPT | Performed by: INTERNAL MEDICINE

## 2020-05-14 RX ORDER — ATORVASTATIN CALCIUM 10 MG/1
10 TABLET, FILM COATED ORAL DAILY
Qty: 90 TABLET | Refills: 3 | Status: SHIPPED | OUTPATIENT
Start: 2020-05-14 | End: 2021-02-17 | Stop reason: SDUPTHER

## 2020-06-11 ENCOUNTER — APPOINTMENT (OUTPATIENT)
Dept: LAB | Facility: CLINIC | Age: 70
End: 2020-06-11
Payer: COMMERCIAL

## 2020-06-11 ENCOUNTER — HOSPITAL ENCOUNTER (OUTPATIENT)
Dept: NON INVASIVE DIAGNOSTICS | Facility: CLINIC | Age: 70
Discharge: HOME | End: 2020-06-11
Payer: COMMERCIAL

## 2020-06-11 DIAGNOSIS — I34.0 NONRHEUMATIC MITRAL VALVE REGURGITATION: ICD-10-CM

## 2020-06-11 LAB
ALBUMIN SERPL BCP-MCNC: 4 G/DL (ref 3.5–5)
ALP SERPL-CCNC: 92 U/L (ref 46–116)
ALT SERPL W P-5'-P-CCNC: 31 U/L (ref 12–78)
AST SERPL W P-5'-P-CCNC: 23 U/L (ref 5–45)
BILIRUB DIRECT SERPL-MCNC: 0.32 MG/DL (ref 0–0.2)
BILIRUB SERPL-MCNC: 0.95 MG/DL (ref 0.2–1)
CHOLEST SERPL-MCNC: 203 MG/DL (ref 50–200)
HDLC SERPL-MCNC: 74 MG/DL
LDLC SERPL CALC-MCNC: 121 MG/DL (ref 0–100)
NONHDLC SERPL-MCNC: 129 MG/DL
PROT SERPL-MCNC: 7.1 G/DL (ref 6.4–8.2)
TRIGL SERPL-MCNC: 42 MG/DL

## 2020-06-11 PROCEDURE — 80061 LIPID PANEL: CPT | Performed by: INTERNAL MEDICINE

## 2020-06-11 PROCEDURE — 93306 TTE W/DOPPLER COMPLETE: CPT

## 2020-06-11 PROCEDURE — 80076 HEPATIC FUNCTION PANEL: CPT | Performed by: INTERNAL MEDICINE

## 2020-06-11 PROCEDURE — 36415 COLL VENOUS BLD VENIPUNCTURE: CPT | Performed by: INTERNAL MEDICINE

## 2020-06-12 PROCEDURE — 93306 TTE W/DOPPLER COMPLETE: CPT | Performed by: INTERNAL MEDICINE

## 2021-02-17 ENCOUNTER — OFFICE VISIT (OUTPATIENT)
Dept: CARDIOLOGY CLINIC | Facility: CLINIC | Age: 71
End: 2021-02-17
Payer: COMMERCIAL

## 2021-02-17 VITALS
SYSTOLIC BLOOD PRESSURE: 140 MMHG | BODY MASS INDEX: 30.69 KG/M2 | TEMPERATURE: 96.3 F | WEIGHT: 214.4 LBS | HEART RATE: 68 BPM | DIASTOLIC BLOOD PRESSURE: 92 MMHG | HEIGHT: 70 IN

## 2021-02-17 DIAGNOSIS — I25.10 CORONARY ARTERY DISEASE INVOLVING NATIVE CORONARY ARTERY OF NATIVE HEART WITHOUT ANGINA PECTORIS: ICD-10-CM

## 2021-02-17 DIAGNOSIS — E78.2 MIXED HYPERLIPIDEMIA: ICD-10-CM

## 2021-02-17 DIAGNOSIS — I34.0 NONRHEUMATIC MITRAL VALVE REGURGITATION: ICD-10-CM

## 2021-02-17 DIAGNOSIS — I10 ESSENTIAL HYPERTENSION: ICD-10-CM

## 2021-02-17 DIAGNOSIS — I71.2 THORACIC AORTIC ANEURYSM WITHOUT RUPTURE (HCC): Primary | ICD-10-CM

## 2021-02-17 PROBLEM — I71.20 THORACIC AORTIC ANEURYSM WITHOUT RUPTURE: Status: ACTIVE | Noted: 2021-02-17

## 2021-02-17 PROCEDURE — 99214 OFFICE O/P EST MOD 30 MIN: CPT | Performed by: INTERNAL MEDICINE

## 2021-02-17 RX ORDER — ATORVASTATIN CALCIUM 10 MG/1
10 TABLET, FILM COATED ORAL DAILY
Qty: 90 TABLET | Refills: 3 | Status: SHIPPED | OUTPATIENT
Start: 2021-02-17 | End: 2021-02-17 | Stop reason: SDUPTHER

## 2021-02-17 RX ORDER — ATORVASTATIN CALCIUM 10 MG/1
10 TABLET, FILM COATED ORAL DAILY
Qty: 90 TABLET | Refills: 3 | Status: SHIPPED | OUTPATIENT
Start: 2021-02-17

## 2021-02-17 NOTE — PROGRESS NOTES
Cardiology Follow Up    [facility]  1950  [id number]  [address] [facility] [address]  [phone]    1  Thoracic aortic aneurysm without rupture ([place] [address] )  CT chest w contrast   2  Coronary artery disease involving native coronary artery of native heart without angina pectoris  atorvastatin (LIPITOR) 10 mg tablet    DISCONTINUED: atorvastatin (LIPITOR) 10 mg tablet   3  Nonrheumatic mitral valve regurgitation     4  Essential hypertension     5  Mixed hyperlipidemia         Interval History:  Feels well without complaint  Denies chest pain shortness of breath orthopnea paroxysmal nocturnal dyspnea or syncope      Patient Active Problem List   Diagnosis    Gastroesophageal reflux disease without esophagitis    HTN (hypertension)    Liver lesion    Coronary artery disease involving native coronary artery of native heart without angina pectoris    Nonrheumatic mitral valve regurgitation    Mixed hyperlipidemia    Thoracic aortic aneurysm without rupture ([place] [address] )     Past Medical History:   Diagnosis Date    Cancer ([place] [address] )     malignant neoplasm of prostate    Chronic back pain     Chronic reflux esophagitis     Disease of thyroid gland     nodule of thyroid     Gastric ulcer     HTN (hypertension)     Hx of bleeding disorder     rectal bleeding    Malignant neoplasm of prostate ([place] [address] )     Polyp of colon     Rectal bleeding     Thyroid nodule      Social History     Socioeconomic History    Marital status: /Civil Union     Spouse name: Not on file    Number of children: Not on file    Years of education: Not on file    Highest education level: Not on file   Occupational History    Not on file   Social Needs    Financial resource strain: Not on file    Food insecurity     Worry: Not on file     Inability: Not on file    Transportation needs     Medical: Not on file     Non-medical: Not on file   Tobacco Use    Smoking status: Never Smoker    Smokeless tobacco: Never Used   Substance and Sexual Activity    Alcohol use: No    Drug use: No    Sexual activity: Not on file   Lifestyle    Physical activity     Days per week: Not on file     Minutes per session: Not on file    Stress: Not on file   Relationships    Social connections     Talks on phone: Not on file     Gets together: Not on file     Attends Rastafari service: Not on file     Active member of club or organization: Not on file     Attends meetings of clubs or organizations: Not on file     Relationship status: Not on file    Intimate partner violence     Fear of current or ex partner: Not on file     Emotionally abused: Not on file     Physically abused: Not on file     Forced sexual activity: Not on file   Other Topics Concern    Not on file   Social History Narrative    Not on file      History reviewed  No pertinent family history  Past Surgical History:   Procedure Laterality Date    CERVICAL SPINE SURGERY      FOOT SURGERY Bilateral     HERNIA REPAIR      HERNIA REPAIR      [place] COLONOSCOPY FLX DX W/COLLJ SPEC WHEN PFRMD N/A 2/14/2017    Procedure: EGD AND COLONOSCOPY;  Surgeon: Michaela Lopez MD;  Location: [facility] GI LAB; Service: Gastroenterology    PROSTATE SURGERY         Current Outpatient Medications:     aspirin 81 mg chewable tablet, Chew 81 mg daily, Disp: , Rfl:     Multiple Vitamin (THERAPEUTIC MULTIVITAMIN PO), Take 1 tablet by mouth, Disp: , Rfl:     albuterol (PROAIR HFA) 90 mcg/act inhaler,  , Disp: , Rfl:     atorvastatin (LIPITOR) 10 mg tablet, Take 1 tablet (10 mg total) by mouth daily, Disp: 90 tablet, Rfl: 3    meclizine (ANTIVERT) 25 mg tablet,  , Disp: , Rfl:     meloxicam (MOBIC) 7 5 mg tablet,  , Disp: , Rfl:     ondansetron (ZOFRAN) 4 mg tablet,  , Disp: , Rfl:     SUMAtriptan (IMITREX) 25 mg tablet,  , Disp: , Rfl:     tadalafil (CIALIS) 5 MG tablet, daily  , Disp: , Rfl:   No Known Allergies    Labs:  No visits with results within 2 Month(s) from this visit  Latest known visit with results is:   Telemedicine on 05/14/2020   Component Date Value    Total Bilirubin 06/11/2020 0 95     Bilirubin, Direct 06/11/2020 0 32*    Alkaline Phosphatase 06/11/2020 92     AST 06/11/2020 23     ALT 06/11/2020 31     Total Protein 06/11/2020 7 1     Albumin 06/11/2020 4 0     Cholesterol 06/11/2020 203*    Triglycerides 06/11/2020 42     HDL, Direct 06/11/2020 74     LDL Calculated 06/11/2020 121*    Non-HDL-Chol (CHOL-HDL) 06/11/2020 129      Imaging: No results found  Review of Systems:  Review of Systems   Constitutional: Negative for fatigue  HENT: Negative for nosebleeds  Eyes: Negative for redness  Respiratory: Negative for chest tightness and shortness of breath  Cardiovascular: Negative for chest pain, palpitations and leg swelling  Gastrointestinal: Negative for abdominal pain  Endocrine: Negative for polyuria  Genitourinary: Negative for urgency  Musculoskeletal: Negative for arthralgias  Skin: Negative for rash  Neurological: Negative for dizziness and syncope  Psychiatric/Behavioral: Negative for confusion and sleep disturbance  The patient is not nervous/anxious  Physical Exam:  Physical Exam  Constitutional:       Appearance: He is well-developed  HENT:      Head: Normocephalic and atraumatic  Nose: Nose normal    Eyes:      Pupils: Pupils are equal, round, and reactive to light  Neck:      Musculoskeletal: Neck supple  Cardiovascular:      Rate and Rhythm: Normal rate and regular rhythm  Heart sounds: Murmur present  Systolic murmur present with a grade of 1/6  Pulmonary:      Effort: Pulmonary effort is normal       Breath sounds: Normal breath sounds  Abdominal:      General: Bowel sounds are normal       Palpations: Abdomen is soft  Musculoskeletal: Normal range of motion  Skin:     General: Skin is warm and dry  Neurological:      Mental Status: He is alert and oriented to person, place, and time  Psychiatric:         Behavior: Behavior normal          Thought Content: Thought content normal          Judgment: Judgment normal          Discussion/Summary:  History of a catheterization in [place] approximately 10 years ago with nonobstructive epicardial coronary artery disease  Normal stress echo in 2018  Asymptomatic in functional class 1  Twelve lead EKG today in the office is normal   He had been started on statin but discontinued it on his own  While on statin he had an improvement in his LDL  He will reinstitute Lipitor 10 mg daily  He will continue aspirin  He had an echocardiogram in [place] 2017 that showed an anteroseptal wall motion abnormality and mild-to-moderate mitral regurgitation  Repeat echo last June showed normal left ventricular wall motion with mild MR  An aortic aneurysm of 4 4 cm was noted  I will check CT scan to confirm sizing  I will follow-up with this by phone  We discussed his blood pressure being mildly elevated to the potential need for beta-blockade if in fact there is an aortic aneurysm  He told me that his blood pressure was measured at home is always within normal limits  I will await the results of his CT scan and then make further recommendations regarding this  I will see him again in 6 months

## 2021-04-05 ENCOUNTER — TELEPHONE (OUTPATIENT)
Dept: CARDIOLOGY CLINIC | Facility: CLINIC | Age: 71
End: 2021-04-05

## 2021-04-05 DIAGNOSIS — I34.0 NONRHEUMATIC MITRAL VALVE REGURGITATION: ICD-10-CM

## 2021-04-05 DIAGNOSIS — I25.10 CORONARY ARTERY DISEASE INVOLVING NATIVE CORONARY ARTERY OF NATIVE HEART WITHOUT ANGINA PECTORIS: Primary | ICD-10-CM

## 2021-04-05 NOTE — TELEPHONE ENCOUNTER
Patient is scheduled for CT scan on Wednesday and absolutely needs BUN & creatinine  Entered script and called patient - but had to leave a message

## 2021-04-06 NOTE — TELEPHONE ENCOUNTER
I called the patient's mob# several times & left messages asking him to call me to confirm  I called the spouse # but that was actually a business  I called radiology to advise that the patient has NOT responded to my calls but the order for blood work is in his chart

## 2021-04-06 NOTE — TELEPHONE ENCOUNTER
Bruna Rinne called back: he is going to reschedule the CT scan because he didn't play his messages and he was unaware he needed blood work done  He already ate breakfast today

## 2021-04-22 ENCOUNTER — APPOINTMENT (OUTPATIENT)
Dept: LAB | Facility: CLINIC | Age: 71
End: 2021-04-22
Payer: COMMERCIAL

## 2021-04-22 LAB
ANION GAP SERPL CALCULATED.3IONS-SCNC: 4 MMOL/L (ref 4–13)
BUN SERPL-MCNC: 18 MG/DL (ref 5–25)
CALCIUM SERPL-MCNC: 9.6 MG/DL (ref 8.3–10.1)
CHLORIDE SERPL-SCNC: 110 MMOL/L (ref 100–108)
CO2 SERPL-SCNC: 28 MMOL/L (ref 21–32)
CREAT SERPL-MCNC: 0.99 MG/DL (ref 0.6–1.3)
GFR SERPL CREATININE-BSD FRML MDRD: 77 ML/MIN/1.73SQ M
GLUCOSE P FAST SERPL-MCNC: 90 MG/DL (ref 65–99)
POTASSIUM SERPL-SCNC: 4.9 MMOL/L (ref 3.5–5.3)
SODIUM SERPL-SCNC: 142 MMOL/L (ref 136–145)

## 2021-04-22 PROCEDURE — 36415 COLL VENOUS BLD VENIPUNCTURE: CPT

## 2021-04-22 PROCEDURE — 80048 BASIC METABOLIC PNL TOTAL CA: CPT

## 2021-08-06 ENCOUNTER — TELEPHONE (OUTPATIENT)
Dept: CARDIOLOGY CLINIC | Facility: CLINIC | Age: 71
End: 2021-08-06

## 2021-08-06 NOTE — TELEPHONE ENCOUNTER
Called, spoke to pt's wife  They have physician close to home and choose to follow there  She requests this appt cancelled  Will cancel and d/c any recalls

## 2021-09-09 ENCOUNTER — OFFICE VISIT (OUTPATIENT)
Dept: GASTROENTEROLOGY | Facility: CLINIC | Age: 71
End: 2021-09-09
Payer: COMMERCIAL

## 2021-09-09 VITALS
HEART RATE: 85 BPM | HEIGHT: 70 IN | DIASTOLIC BLOOD PRESSURE: 88 MMHG | WEIGHT: 209.4 LBS | BODY MASS INDEX: 29.98 KG/M2 | SYSTOLIC BLOOD PRESSURE: 140 MMHG | TEMPERATURE: 98.3 F

## 2021-09-09 DIAGNOSIS — K21.9 GASTROESOPHAGEAL REFLUX DISEASE WITHOUT ESOPHAGITIS: Primary | ICD-10-CM

## 2021-09-09 DIAGNOSIS — Z12.11 COLON CANCER SCREENING: ICD-10-CM

## 2021-09-09 DIAGNOSIS — K76.9 LIVER LESION: ICD-10-CM

## 2021-09-09 PROCEDURE — 99214 OFFICE O/P EST MOD 30 MIN: CPT | Performed by: INTERNAL MEDICINE

## 2021-09-09 NOTE — PROGRESS NOTES
[facility] - Outpatient Follow-up Note  Yesi Late 80 y o  male MRN: [id number]  Encounter: [id number]          ASSESSMENT AND PLAN:      1  Gastroesophageal reflux disease without esophagitis  I encouraged him to continue the lifestyle and dietary modifications that were outlined in the handout I gave him during his last visit  Fortunately this seems to be helping  He can take famotidine as needed for breakthrough symptoms  2  Liver lesion  He most likely has a small liver cyst or hemangioma and no further workup is indicated  3  Colon cancer screening  He is due for his next screening colonoscopy in 2022  I will have him follow-up in six months and we can schedule him for his colonoscopy at that time  ______________________________________________________________________    SUBJECTIVE:  He presents for follow-up of his reflux and abdominal pain  He feels his reflux has been well controlled with lifestyle and dietary modifications and has not had to take any Pepcid as needed  He had one episode of mild abdominal discomfort and cramping that seem to start in the right side and shift to the left side and that resolved on its own  He has not had any abdominal pain or discomfort in the past few weeks  He denies any change in bowel habits, bleeding, dysphagia, or weight loss  REVIEW OF SYSTEMS IS OTHERWISE NEGATIVE        Historical Information   Past Medical History:   Diagnosis Date    Cancer [facility])     malignant neoplasm of prostate    Chronic back pain     Chronic reflux esophagitis     Disease of thyroid gland     nodule of thyroid     Gastric ulcer     HTN (hypertension)     Hx of bleeding disorder     rectal bleeding    Malignant neoplasm of prostate (HCC)     Polyp of colon     Rectal bleeding     Thyroid nodule      Past Surgical History:   Procedure Laterality Date    CERVICAL SPINE SURGERY      COLONOSCOPY      FOOT SURGERY Bilateral     HERNIA REPAIR      HERNIA REPAIR      [place] COLON[place]COPY FLX DX W/COLLJ SPEC WHEN PFRMD N/A 2/14/2017    Procedure: EGD AND COLONOSCOPY;  Surgeon: Manuela Gasca MD;  Location: [facility] GI LAB; Service: Gastroenterology    PROSTATE SURGERY      UPPER GASTROINTESTINAL ENDOSCOPY       Social History   Social History     Substance and Sexual Activity   Alcohol Use No     Social History     Substance and Sexual Activity   Drug Use No     Social History     Tobacco Use   Smoking Status Never Smoker   Smokeless Tobacco Never Used     History reviewed  No pertinent family history  Meds/Allergies       Current Outpatient Medications:     aspirin 81 mg chewable tablet    Multiple Vitamin (THERAPEUTIC MULTIVITAMIN PO)    albuterol (PROAIR HFA) 90 mcg/act inhaler    atorvastatin (LIPITOR) 10 mg tablet    meclizine (ANTIVERT) 25 mg tablet    meloxicam (MOBIC) 7 5 mg tablet    ondansetron (ZOFRAN) 4 mg tablet    SUMAtriptan (IMITREX) 25 mg tablet    tadalafil (CIALIS) 5 MG tablet    No Known Allergies        Objective     Blood pressure 140/88, pulse 85, temperature 98 3 °F (36 8 °C), temperature source Tympanic, height 5' 10" (1 778 m), weight 95 kg (209 lb 6 4 oz)  Body mass index is 30 05 kg/m²  PHYSICAL EXAM:      General Appearance:   Alert, cooperative, no distress   HEENT:   Normocephalic, atraumatic, anicteric      Neck:  Supple, symmetrical, trachea midline   Lungs:   Clear to auscultation bilaterally; no rales, rhonchi or wheezing; respirations unlabored    Heart[de-identified]   Regular rate and rhythm; no murmur, rub, or gallop  Abdomen:   Soft, non-tender, non-distended; normal bowel sounds; no masses, no organomegaly    Genitalia:   Deferred    Rectal:   Deferred    Extremities:  No cyanosis, clubbing or edema    Pulses:  2+ and symmetric    Skin:  No jaundice, rashes, or lesions    Lymph nodes:  No palpable cervical lymphadenopathy        Lab Results:   No visits with results within 1 Day(s) from this visit  Latest known visit with results is:   Telephone on 04/05/2021   Component Date Value    Sodium 04/22/2021 142     Potassium 04/22/2021 4 9     Chloride 04/22/2021 110*    CO2 04/22/2021 28     ANION GAP 04/22/2021 4     BUN 04/22/2021 18     Creatinine 04/22/2021 0 99     Glucose, Fasting 04/22/2021 90     Calcium 04/22/2021 9 6     eGFR 04/22/2021 77          Radiology Results:   No results found

## 2022-02-22 ENCOUNTER — TELEPHONE (OUTPATIENT)
Dept: GASTROENTEROLOGY | Facility: CLINIC | Age: 72
End: 2022-02-22

## 2022-02-22 ENCOUNTER — TELEMEDICINE (OUTPATIENT)
Dept: GASTROENTEROLOGY | Facility: CLINIC | Age: 72
End: 2022-02-22
Payer: COMMERCIAL

## 2022-02-22 DIAGNOSIS — K62.5 RECTAL BLEEDING: ICD-10-CM

## 2022-02-22 DIAGNOSIS — K21.9 GASTROESOPHAGEAL REFLUX DISEASE WITHOUT ESOPHAGITIS: Primary | ICD-10-CM

## 2022-02-22 DIAGNOSIS — Z86.010 HISTORY OF COLONIC POLYPS: ICD-10-CM

## 2022-02-22 DIAGNOSIS — K76.9 LIVER LESION: ICD-10-CM

## 2022-02-22 PROBLEM — Z86.0100 HISTORY OF COLONIC POLYPS: Status: ACTIVE | Noted: 2022-02-22

## 2022-02-22 PROCEDURE — 99214 OFFICE O/P EST MOD 30 MIN: CPT | Performed by: INTERNAL MEDICINE

## 2022-02-22 RX ORDER — OMEPRAZOLE 20 MG/1
20 CAPSULE, DELAYED RELEASE ORAL DAILY
Qty: 30 CAPSULE | Refills: 3 | Status: SHIPPED | OUTPATIENT
Start: 2022-02-22

## 2022-02-22 NOTE — PROGRESS NOTES
Virtual Regular Visit    Verification of patient location:    Patient is located in the following state in which I hold an active license [place]      Assessment/Plan:    Problem List Items Addressed This Visit        Digestive    Gastroesophageal reflux disease without esophagitis - Primary       Other    Liver lesion    History of colonic polyps        1  Gastroesophageal reflux disease without esophagitis  He has gastroesophageal reflux disease and since his symptoms have worsened with Tums and lifestyle and dietary modifications, I will give him a trial of omeprazole 20 mg daily  - omeprazole (PriLOSEC) 20 mg delayed release capsule; Take 1 capsule (20 mg total) by mouth daily  Dispense: 30 capsule; Refill: 3    2  Liver lesion  He has a history of a tiny liver lesion which was felt to be either a small cyst or a small hemangioma  He does not require further workup of this or monitoring unless he develops new symptoms  3  History of colonic polyps  4  Rectal bleeding  I will schedule for colonoscopy since he is due for surveillance and also because of his rectal bleeding  The bleeding is almost certainly due to hemorrhoids given the intermittent mild nature but I will evaluate for other causes during his upcoming colonoscopy  - Colonoscopy; Future       Reason for visit is   Chief Complaint   Patient presents with    Virtual Regular Visit        Encounter provider Denise Andrews MD    Provider located at [address] [place] [address] [address] [place] [address]  [phone]      Recent Visits  No visits were found meeting these conditions  Showing recent visits within past 7 days and meeting all other requirements  Future Appointments  No visits were found meeting these conditions  Showing future appointments within next 150 days and meeting all other requirements       The patient was identified by name and date of birth  Fco Joy was informed that this is a telemedicine visit and that the visit is being conducted through [address] [employer] and patient was informed that this is a secure, HIPAA-compliant platform  He agrees to proceed     My office door was closed  No one else was in the room  He acknowledged consent and understanding of privacy and security of the video platform  The patient has agreed to participate and understands they can discontinue the visit at any time  Patient is aware this is a billable service  Subjective  Fco Joy is a 80 y o  male presents for follow-up of his reflux  He feels his reflux symptoms have worsened since his last visit and he now takes Tums for 5 times a day and even then he continues to have reflux at times  He denies nausea, vomiting, dysphagia, change in bowel habits, and weight loss  He has a history of colon polyps and his last colonoscopy was in February 2017 and he was asked repeat the procedure in five years  Recently has had some intermittent rectal bleeding occurring with hard stools and even soft stools  The bleeding has been mild and intermittent  Past Medical History:   Diagnosis Date    Cancer [facility])     malignant neoplasm of prostate    Chronic back pain     Chronic reflux esophagitis     Disease of thyroid gland     nodule of thyroid     Gastric ulcer     HTN (hypertension)     Hx of bleeding disorder     rectal bleeding    Malignant neoplasm of prostate (HCC)     Polyp of colon     Rectal bleeding     Thyroid nodule        Past Surgical History:   Procedure Laterality Date    CERVICAL SPINE SURGERY      COLONOSCOPY      FOOT SURGERY Bilateral     HERNIA REPAIR      HERNIA REPAIR      [place] COLONOSCOPY FLX DX W/COLLJ SPEC WHEN PFRMD N/A 2/14/2017    Procedure: EGD AND COLONOSCOPY;  Surgeon: Pelon Pro MD;  Location: [facility] GI LAB;   Service: Gastroenterology    PROSTATE SURGERY      UPPER GASTROINTESTINAL ENDOSCOPY         Current Outpatient Medications   Medication Sig Dispense Refill    albuterol (PROAIR HFA) 90 mcg/act inhaler   (Patient not taking: Reported on 9/9/2021)      aspirin 81 mg chewable tablet Chew 81 mg daily      atorvastatin (LIPITOR) 10 mg tablet Take 1 tablet (10 mg total) by mouth daily (Patient not taking: Reported on 9/9/2021) 90 tablet 3    meclizine (ANTIVERT) 25 mg tablet   (Patient not taking: Reported on 9/9/2021)      meloxicam (MOBIC) 7 5 mg tablet   (Patient not taking: Reported on 9/9/2021)      Multiple Vitamin (THERAPEUTIC MULTIVITAMIN PO) Take 1 tablet by mouth      ondansetron (ZOFRAN) 4 mg tablet   (Patient not taking: Reported on 9/9/2021)      SUMAtriptan (IMITREX) 25 mg tablet   (Patient not taking: Reported on 9/9/2021)      tadalafil (CIALIS) 5 MG tablet daily  (Patient not taking: Reported on 9/9/2021)       No current facility-administered medications for this visit  No Known Allergies    REVIEW OF SYSTEMS:    CONSTITUTIONAL: Denies any fever, chills, rigors, and weight loss  HEENT: No earache or tinnitus  Denies hearing loss or visual disturbances  CARDIOVASCULAR: No chest pain or palpitations  RESPIRATORY: Denies any cough, hemoptysis, shortness of breath or dyspnea on exertion  GASTROINTESTINAL: As noted in the History of Present Illness  GENITOURINARY: No problems with urination  Denies any hematuria or dysuria  NEUROLOGIC: No dizziness or vertigo, denies headaches  MUSCULOSKELETAL: Denies any muscle or joint pain  SKIN: Denies skin rashes or itching  ENDOCRINE: Denies excessive thirst  Denies intolerance to heat or cold  PSYCHOSOCIAL: Denies depression or anxiety  Denies any recent memory loss  PHYSICAL EXAMINATION:  Appearance and vitals taken from home devices    General Appearance:   Alert, cooperative, no distress   HEENT:  Normocephalic, atraumatic, anicteric  Neck supple, symmetrical, trachea midline     Lungs:   Equal chest rise and unlabored breathing, normal effort, no coughing  Cardiovascular:   No visualized JVD  Abdomen:   No abdominal distension  Skin:   No jaundice, rashes, or lesions  Musculoskeletal:   Normal range of motion visualized  Psych:  Normal affect and normal insight  Neuro:  Alert and appropriate  I spent 15 minutes directly with the patient during this visit    VIRTUAL VISIT DISCLAIMER      Danette Blanton verbally agrees to participate in [employer]  Pt is aware that [employer] could be limited without vital signs or the ability to perform a full hands-on physical exam  Riki Dyer understands he or the provider may request at any time to terminate the video visit and request the patient to seek care or treatment in person

## 2022-02-22 NOTE — TELEPHONE ENCOUNTER
Scheduled date of colonoscopy (as of today):05 10 22  Physician performing colonoscopy:DR SPENCER  Location of colonoscopy:[facility]  Bowel prep reviewed with patient:MIRALAX/MAGNESIUM CITRATE  Instructions reviewed with patient by:OFFICE  Clearances: N/A

## 2022-05-10 ENCOUNTER — HOSPITAL ENCOUNTER (OUTPATIENT)
Dept: GASTROENTEROLOGY | Facility: MEDICAL CENTER | Age: 72
Setting detail: OUTPATIENT SURGERY
Discharge: HOME | End: 2022-05-10
Attending: INTERNAL MEDICINE | Admitting: INTERNAL MEDICINE
Payer: COMMERCIAL

## 2022-05-10 ENCOUNTER — ANESTHESIA (OUTPATIENT)
Dept: GASTROENTEROLOGY | Facility: MEDICAL CENTER | Age: 72
End: 2022-05-10

## 2022-05-10 ENCOUNTER — ANESTHESIA EVENT (OUTPATIENT)
Dept: GASTROENTEROLOGY | Facility: MEDICAL CENTER | Age: 72
End: 2022-05-10

## 2022-05-10 VITALS
RESPIRATION RATE: 20 BRPM | BODY MASS INDEX: 31.25 KG/M2 | WEIGHT: 211 LBS | DIASTOLIC BLOOD PRESSURE: 62 MMHG | HEART RATE: 62 BPM | SYSTOLIC BLOOD PRESSURE: 109 MMHG | TEMPERATURE: 97.4 F | OXYGEN SATURATION: 97 % | HEIGHT: 69 IN

## 2022-05-10 DIAGNOSIS — Z86.010 HISTORY OF COLONIC POLYPS: ICD-10-CM

## 2022-05-10 DIAGNOSIS — K62.5 RECTAL BLEEDING: ICD-10-CM

## 2022-05-10 PROCEDURE — 88305 TISSUE EXAM BY PATHOLOGIST: CPT | Performed by: PATHOLOGY

## 2022-05-10 PROCEDURE — 45385 COLONOSCOPY W/LESION REMOVAL: CPT | Performed by: INTERNAL MEDICINE

## 2022-05-10 RX ORDER — PROPOFOL 10 MG/ML
INJECTION, EMULSION INTRAVENOUS AS NEEDED
Status: DISCONTINUED | OUTPATIENT
Start: 2022-05-10 | End: 2022-05-10

## 2022-05-10 RX ORDER — SODIUM CHLORIDE 9 MG/ML
125 INJECTION, SOLUTION INTRAVENOUS CONTINUOUS
Status: DISCONTINUED | OUTPATIENT
Start: 2022-05-10 | End: 2022-05-14 | Stop reason: HOSPADM

## 2022-05-10 RX ADMIN — PROPOFOL 50 MG: 10 INJECTION, EMULSION INTRAVENOUS at 09:52

## 2022-05-10 RX ADMIN — PROPOFOL 100 MG: 10 INJECTION, EMULSION INTRAVENOUS at 09:51

## 2022-05-10 RX ADMIN — PROPOFOL 50 MG: 10 INJECTION, EMULSION INTRAVENOUS at 09:53

## 2022-05-10 RX ADMIN — PROPOFOL 50 MG: 10 INJECTION, EMULSION INTRAVENOUS at 09:56

## 2022-05-10 RX ADMIN — SODIUM CHLORIDE 125 ML/HR: 0.9 INJECTION, SOLUTION INTRAVENOUS at 09:40

## 2022-05-10 RX ADMIN — PROPOFOL 50 MG: 10 INJECTION, EMULSION INTRAVENOUS at 10:06

## 2022-05-10 RX ADMIN — PROPOFOL 50 MG: 10 INJECTION, EMULSION INTRAVENOUS at 09:59

## 2022-05-10 RX ADMIN — PROPOFOL 50 MG: 10 INJECTION, EMULSION INTRAVENOUS at 09:54

## 2022-05-10 RX ADMIN — PROPOFOL 50 MG: 10 INJECTION, EMULSION INTRAVENOUS at 10:02

## 2022-05-10 RX ADMIN — PROPOFOL 50 MG: 10 INJECTION, EMULSION INTRAVENOUS at 09:55

## 2022-05-10 NOTE — DISCHARGE INSTRUCTIONS
Colonoscopy   WHAT YOU NEED TO KNOW:   A colonoscopy is a procedure to examine the inside of your colon (intestine) with a scope  Polyps or tissue growths may have been removed during your colonoscopy  It is normal to feel bloated and to have some abdominal discomfort  You should be passing gas  If you have hemorrhoids or you had polyps removed, you may have a small amount of bleeding  DISCHARGE INSTRUCTIONS:   Seek care immediately if:    You have sudden, severe abdominal pain   You have problems swallowing   You have a large amount of black, sticky bowel movements or blood in your bowel movements   You have sudden trouble breathing   You feel weak, lightheaded, or faint or your heart beats faster than normal for you  Contact your healthcare provider if:    You have a fever and chills   You have nausea or are vomiting   Your abdomen is bloated or feels full and hard   You have abdominal pain   You have black, sticky bowel movements or blood in your bowel movements   You have not had a bowel movement for 3 days after your procedure   You have rash or hives   You have questions or concerns about your procedure  Activity:    Do not lift, strain, or run for 24 hours after your procedure   Rest after your procedure  You have been given medicine to relax you  Do not drive or make important decisions until the day after your procedure  Return to your normal activity as directed   Relieve gas and discomfort from bloating by lying on your right side with a heating pad on your abdomen  You may need to take short walks to help the gas move out  Eat small meals until bloating is relieved  Follow up with your healthcare provider as directed: Write down your questions so you remember to ask them during your visits  If you take a blood thinner, please review the specific instructions from your endoscopist about when you should resume it   These can be found in the Recommendation and Your Medication list sections of this After Visit Summary  Colorectal Polyps   WHAT YOU NEED TO KNOW:   Colorectal polyps are small growths of tissue in the lining of the colon and rectum  Most polyps are hyperplastic polyps and are usually benign (noncancerous)  Certain types of polyps, called adenomatous polyps, may turn into cancer  DISCHARGE INSTRUCTIONS:   Follow up with your healthcare provider or gastroenterologist as directed: You may need to return for more tests, such as another colonoscopy  Write down your questions so you remember to ask them during your visits  Reduce your risk for colorectal polyps:   · Eat a variety of healthy foods:  Healthy foods include fruit, vegetables, whole-grain breads, low-fat dairy products, beans, lean meat, and fish  Ask if you need to be on a special diet  · Maintain a healthy weight:  Ask your healthcare provider if you need to lose weight and how much you need to lose  Ask for help with a weight loss program     · Exercise:  Begin to exercise slowly and do more as you get stronger  Talk with your healthcare provider before you start an exercise program      · Limit alcohol:  Your risk for polyps increases the more you drink  · Do not smoke: If you smoke, it is never too late to quit  Ask for information about how to stop  For support and more information:   · Elzbieta Jang ([facility]) [address], [place] [address]  Phone: 9- [phone] - [phone]  Web Address: www digestive  niddk nih gov    Contact your healthcare provider or gastroenterologist if:   · You have a fever  · You have chills, a cough, or feel weak and achy  · You have abdominal pain that does not go away or gets worse after you take medicine  · Your abdomen is swollen  · You are losing weight without trying  · You have questions or concerns about your condition or care      Seek care immediately or call 911 if:   · You have sudden shortness of breath  · You have a fast heart rate, fast breathing, or are too dizzy to stand up  · You have severe abdominal pain  · You see blood in your bowel movement  © Copyright [address] Information is for End User's use only and may not be sold, redistributed or otherwise used for commercial purposes  All illustrations and images included in CareNotes® are the copyrighted property of [employer]  or [facility] Maryan Noble   The above information is an  only  It is not intended as medical advice for individual conditions or treatments  Talk to your doctor, nurse or pharmacist before following any medical regimen to see if it is safe and effective for you

## 2022-05-10 NOTE — ANESTHESIA POSTPROCEDURE EVALUATION
Post-Op Assessment Note    CV Status:  Stable    Pain management: adequate     Mental Status:  Alert and awake   Hydration Status:  Euvolemic   PONV Controlled:  Controlled   Airway Patency:  Patent      Post Op Vitals Reviewed: Yes      Staff: Anesthesiologist         No complications documented      BP      Temp     Pulse     Resp      SpO2      /62   Pulse 62   Temp (!) 97 4 °F (36 3 °C) (Temporal)   Resp 20   Ht 5' 9" (1 753 m)   Wt 95 7 kg (211 lb)   SpO2 97%   BMI 31 16 kg/m²

## 2022-05-10 NOTE — ANESTHESIA PREPROCEDURE EVALUATION
Procedure:  COLONOSCOPY    Relevant Problems   ANESTHESIA (within normal limits)      CARDIO   (+) Coronary artery disease involving native coronary artery of native heart without angina pectoris   (+) HTN (hypertension)   (+) Mixed hyperlipidemia   (+) Nonrheumatic mitral valve regurgitation   (+) Thoracic aortic aneurysm without rupture (HCC)      GI/HEPATIC   (+) Gastroesophageal reflux disease without esophagitis   (+) Rectal bleeding      NEURO/PSYCH   (+) History of colonic polyps        Physical Exam    Airway    Mallampati score: III  TM Distance: >3 FB  Neck ROM: full     Dental       Cardiovascular  Rhythm: regular, Rate: normal,     Pulmonary  Breath sounds clear to auscultation,     Other Findings        Anesthesia Plan  ASA Score- 3     Anesthesia Type- IV sedation with anesthesia with ASA Monitors  Additional Monitors:   Airway Plan:           Plan Factors-Exercise tolerance (METS): >4 METS  Chart reviewed  Patient summary reviewed  Patient is not a current smoker  Induction- intravenous  Postoperative Plan-     Informed Consent- Anesthetic plan and risks discussed with patient

## 2022-05-10 NOTE — H&P
History and Physical -  [facility]  Deirdre Villanueva 71 y o  male MRN: [id number]                  HPI: Deirdre Villanueva is a 71y o  year old male who presents for colonoscopy due to history of colon polyps and hematochezia  REVIEW OF SYSTEMS: Per the HPI, and otherwise unremarkable  Historical Information   Past Medical History:   Diagnosis Date    Cancer [facility])     malignant neoplasm of prostate    Chronic back pain     Chronic reflux esophagitis     COVID-19     Disease of thyroid gland     nodule of thyroid     Gastric ulcer     GERD (gastroesophageal reflux disease)     Hx of bleeding disorder     rectal bleeding    Hypertension     Malignant neoplasm of prostate (HCC)     Palpitations     Polyp of colon     Rectal bleeding     Sleep apnea     Thyroid nodule      Past Surgical History:   Procedure Laterality Date    BACK SURGERY      CARDIAC CATHETERIZATION      CERVICAL SPINE SURGERY      COLONOSCOPY      FOOT SURGERY Bilateral     HERNIA REPAIR      HERNIA REPAIR      [place] COLONOSCOPY FLX DX W/COLLJ SPEC WHEN PFRMD N/A 2/14/2017    Procedure: EGD AND COLONOSCOPY;  Surgeon: Bernardo Green MD;  Location: [facility] GI LAB; Service: Gastroenterology    PROSTATE SURGERY      UPPER GASTROINTESTINAL ENDOSCOPY       Social History   Social History     Substance and Sexual Activity   Alcohol Use No     Social History     Substance and Sexual Activity   Drug Use No     Social History     Tobacco Use   Smoking Status Never Smoker   Smokeless Tobacco Never Used     History reviewed  No pertinent family history  Meds/Allergies     (Not in a hospital admission)      No Known Allergies    Objective     Blood pressure 143/87, pulse 80, temperature (!) 97 4 °F (36 3 °C), temperature source Temporal, resp  rate 20, height 5' 9" (1 753 m), weight 95 7 kg (211 lb), SpO2 97 %        PHYSICAL EXAM    Gen: NAD  CV: RRR  CHEST: Clear  ABD: soft, NT/ND  EXT: no edema      ASSESSMENT/PLAN:  Erick Chao is a 71y o  year old male who presents for colonoscopy due to history of colon polyps and hematochezia  The patient is stable and optimized for the procedure, we reviewed risk and benefits  Risk include but not limited to infection, bleeding, perforation and missing a lesion

## 2022-05-23 NOTE — RESULT ENCOUNTER NOTE
Polyp was an adenoma (precancerous but no cancer) so repeat colonoscopy in 5 years  This was communicated via [address]

## 2022-09-26 ENCOUNTER — OFFICE VISIT (OUTPATIENT)
Dept: GASTROENTEROLOGY | Facility: CLINIC | Age: 72
End: 2022-09-26
Payer: COMMERCIAL

## 2022-09-26 VITALS
DIASTOLIC BLOOD PRESSURE: 81 MMHG | SYSTOLIC BLOOD PRESSURE: 135 MMHG | TEMPERATURE: 97.5 F | WEIGHT: 220.6 LBS | BODY MASS INDEX: 32.67 KG/M2 | HEIGHT: 69 IN

## 2022-09-26 DIAGNOSIS — Z86.010 HISTORY OF COLONIC POLYPS: ICD-10-CM

## 2022-09-26 DIAGNOSIS — K21.9 GASTROESOPHAGEAL REFLUX DISEASE WITHOUT ESOPHAGITIS: Primary | ICD-10-CM

## 2022-09-26 DIAGNOSIS — K62.5 RECTAL BLEEDING: ICD-10-CM

## 2022-09-26 PROCEDURE — 99214 OFFICE O/P EST MOD 30 MIN: CPT | Performed by: INTERNAL MEDICINE

## 2022-09-26 NOTE — PROGRESS NOTES
[facility] - Outpatient Follow-up Note  Brent Mage 71 y o  male MRN: [id number]  Encounter: [id number]          ASSESSMENT AND PLAN:      1  Gastroesophageal reflux disease without esophagitis  He has reflux symptoms so I encouraged him to take the omeprazole daily  I explained that omeprazole is not a cure and that it is it treatment for the condition of reflux and if he stops taking it the reflux is likely to recur  I will have him continue this daily for three months and give him a handout detailing lifestyle and dietary modifications that should help his symptoms  When he returns in three months we can reassess his symptoms and determine if we want to try stopping the omeprazole or continue indefinitely  2  Rectal bleeding  3  History of colonic polyps  I encouraged him to take MiraLax daily for his constipation as this could help his bleeding as well  His bleeding is likely due to hemorrhoids due to straining  He can adjust the MiraLax dose as needed  ______________________________________________________________________    SUBJECTIVE:  He presents for follow-up after his recent colonoscopy  He had a colon adenoma removed and repeat colonoscopy was recommended in five years  He has had intermittent constipation and sometimes when he is straining he has rectal bleeding  He denies diarrhea, abdominal pain, and weight loss  He feels his reflux symptoms have remained intermittent  He started taking omeprazole and felt better but then he stopped it because he was feeling better  He denies any dysphagia, nausea, or vomiting  REVIEW OF SYSTEMS IS OTHERWISE NEGATIVE        Historical Information   Past Medical History:   Diagnosis Date    CAD (coronary artery disease), native coronary artery     Cancer (HCC)     malignant neoplasm of prostate    Chronic back pain     Chronic reflux esophagitis     COVID-19     Disease of thyroid gland     nodule of thyroid  Gastric ulcer     GERD (gastroesophageal reflux disease)     Heart murmur     mitral valve regurgitation    Hx of bleeding disorder     rectal bleeding    Hyperlipidemia     Hypertension     Malignant neoplasm of prostate (HCC)     Palpitations     Polyp of colon     Rectal bleeding     Sleep apnea     Thoracic aortic aneurysm (HCC)     Thyroid nodule      Past Surgical History:   Procedure Laterality Date    BACK SURGERY      CARDIAC CATHETERIZATION      CERVICAL SPINE SURGERY      COLONOSCOPY      FOOT SURGERY Bilateral     HERNIA REPAIR      HERNIA REPAIR      [place] COLONOSCOPY FLX DX W/COLLJ SPEC WHEN PFRMD N/A 2/14/2017    Procedure: EGD AND COLONOSCOPY;  Surgeon: Ghulam Perez MD;  Location: [facility] GI LAB; Service: Gastroenterology    PROSTATE SURGERY      UPPER GASTROINTESTINAL ENDOSCOPY       Social History   Social History     Substance and Sexual Activity   Alcohol Use No     Social History     Substance and Sexual Activity   Drug Use No     Social History     Tobacco Use   Smoking Status Never Smoker   Smokeless Tobacco Never Used     History reviewed  No pertinent family history  Meds/Allergies       Current Outpatient Medications:     albuterol (PROAIR HFA) 90 mcg/act inhaler    aspirin 81 mg chewable tablet    atorvastatin (LIPITOR) 10 mg tablet    meclizine (ANTIVERT) 25 mg tablet    meloxicam (MOBIC) 7 5 mg tablet    Multiple Vitamin (THERAPEUTIC MULTIVITAMIN PO)    omeprazole (PriLOSEC) 20 mg delayed release capsule    ondansetron (ZOFRAN) 4 mg tablet    SUMAtriptan (IMITREX) 25 mg tablet    tadalafil (CIALIS) 5 MG tablet    No Known Allergies        Objective     Blood pressure 135/81, temperature 97 5 °F (36 4 °C), temperature source Tympanic, height 5' 9" (1 753 m), weight 100 kg (220 lb 9 6 oz)  Body mass index is 32 58 kg/m²        PHYSICAL EXAM:      General Appearance:   Alert, cooperative, no distress   HEENT:   Normocephalic, atraumatic, anicteric    Neck:  Supple, symmetrical, trachea midline   Lungs:   Clear to auscultation bilaterally; no rales, rhonchi or wheezing; respirations unlabored    Heart[de-identified]   Regular rate and rhythm; no murmur, rub, or gallop  Abdomen:   Soft, non-tender, non-distended; normal bowel sounds; no masses, no organomegaly    Genitalia:   Deferred    Rectal:   Deferred    Extremities:  No cyanosis, clubbing or edema    Pulses:  2+ and symmetric    Skin:  No jaundice, rashes, or lesions    Lymph nodes:  No palpable cervical lymphadenopathy        Lab Results:   No visits with results within 1 Day(s) from this visit  Latest known visit with results is:   Hospital Outpatient Visit on 05/10/2022   Component Date Value    Case Report 05/10/2022                      Value:Surgical Pathology Report                         Case: [id number]                                   Authorizing Provider:  Josephine Spain MD           Collected:           05/10/2022 1006              Ordering Location:     [facility]        Received:            05/10/2022 6885                                     [address] Endoscopy                                                     Pathologist:           Alfredo Mack MD                                                                  Specimens:   A) - Large Intestine, Sigmoid Colon, sigmoid colon cold snare                                       B) - Rectum, rectal polyp cold snare                                                       Final Diagnosis 05/10/2022                      Value: This result contains rich text formatting which cannot be displayed here   Additional Information 05/10/2022                      Value: This result contains rich text formatting which cannot be displayed here      Synoptic Checklist 05/10/2022                      Value:                            COLON/RECTUM POLYP FORM - GI - All Specimens Mark Serna Description 05/10/2022                      Value: This result contains rich text formatting which cannot be displayed here  Radiology Results:   No results found

## 2024-01-08 ENCOUNTER — TELEPHONE (OUTPATIENT)
Age: 74
End: 2024-01-08

## 2024-01-11 ENCOUNTER — OFFICE VISIT (OUTPATIENT)
Dept: GASTROENTEROLOGY | Facility: CLINIC | Age: 74
End: 2024-01-11
Payer: COMMERCIAL

## 2024-01-11 VITALS
BODY MASS INDEX: 30.36 KG/M2 | DIASTOLIC BLOOD PRESSURE: 90 MMHG | HEIGHT: 69 IN | SYSTOLIC BLOOD PRESSURE: 130 MMHG | WEIGHT: 205 LBS | TEMPERATURE: 99.1 F

## 2024-01-11 DIAGNOSIS — K59.00 CONSTIPATION, UNSPECIFIED CONSTIPATION TYPE: ICD-10-CM

## 2024-01-11 DIAGNOSIS — Z86.010 HISTORY OF COLONIC POLYPS: ICD-10-CM

## 2024-01-11 DIAGNOSIS — K21.9 GASTROESOPHAGEAL REFLUX DISEASE WITHOUT ESOPHAGITIS: Primary | ICD-10-CM

## 2024-01-11 PROCEDURE — 99214 OFFICE O/P EST MOD 30 MIN: CPT | Performed by: INTERNAL MEDICINE

## 2024-01-11 RX ORDER — BIOTIN 10 MG
1 TABLET ORAL
COMMUNITY

## 2024-01-11 RX ORDER — CEPHALEXIN 500 MG/1
TABLET ORAL
COMMUNITY
Start: 2024-01-09

## 2024-01-11 RX ORDER — LOSARTAN POTASSIUM 100 MG/1
1 TABLET ORAL DAILY
COMMUNITY

## 2024-01-11 NOTE — PATIENT INSTRUCTIONS
Patient was scheduled for Gastric emptying as well as a follow up with Dr. Jordan in [place] for 09/06/2024   At 11:20

## 2024-01-11 NOTE — PROGRESS NOTES
[facility] - Outpatient Follow-up Note  Riki Dyer 73 y.o. male MRN: [id number]  Encounter: [id number]          ASSESSMENT AND PLAN:      1. Gastroesophageal reflux disease without esophagitis  Ax on Cx Constipation  Long-term history of GERD, previously on PPI however pt had completed therapy after he no longer had reflux sx's  Been without PPI or H2A for over one year  Currently complaints appear to be GERD-related in nature (vague abdominal pain, acid reflux, worse after meals) and we will therefore have patient c/w PPI for now and report back in a couple of weeks  Not on any scheduled NSAID for recent BP, no recent opiate use  No red flags such as weight loss, vomitus, blood in stool/vomit, anorexia  Abdominal pain worsening after meals however does raise some suspicion for decreased transit time, therefore will order NM gastric emptying for now  Will have pt schedule for now in case needed, as scheduling may take months  If current abdominal pain does not respond to daily PPI for 2-3 weeks, we will have him go ahead and get study  Pt also recently had some change in stool habitus (small, hard) a/w constipation for 4-5 days about 2 weeks ago. Not currently with constipation and notes regularly stools (once q2-3 days)  Will recommend pt re-start MiraLax at low dose (eg, half a cap) in order to keep BM regular as constipation can also exacerbate abdominal pain    Plan:    Continue with Prilosec 20 every morning 30 minutes before first meal x 2 to 3 weeks and reassess abdominal pain  Restart MiraLAX at half normal dose daily scheduled   Return to clinic in 2 months with SYDNIE to follow-up on current complaints  Proceed with gastric emptying study if abdominal pain not relieved with PPI and stool softener    Orders Placed This Encounter   Procedures   • NM gastric emptying     Standing Status:   Future     Standing Expiration Date:   1/11/2028     Scheduling Instructions:      THIS IS  A 4 HOUR TEST.  No food or drink from midnight the night before the exam. If you are diabetic and take medication please bring it with you. Please bring your insurance cards, a form of photo ID and alist of your medications with you. Arrive 15 minutes prior to your appointment time in order to register. On the day of your test, please bring any prior studies of this area with you that were not performed at a [facility].            To schedule this appointment, please contact Central Scheduling at [phone].     Order Specific Question:   Reason for Exam (FREE TEXT)     Answer:   GERD, recurrent despite PPI use       ______________________________________________________________________    SUBJECTIVE:      Mr Dyer is a 74 y/o man with h/o GERD previously on PPI and colonic tubular adenoma with bloody stools who comes here for ax GI complaint.    Today he is here with lower abdominal pain that 'comes and goes', along with feelings of nausea and occasional reflux when eating certain foods x 1 week. He notices these issues more commonly with spicy foods or foods with tomato sauce, however this can occur with any meal. He does in particular state that he has been having 'low-sodium' soups which never cause any abdominal pain. This pain does not last all-day when present, but can be exacerbated by eating.     He additionally states he had change in stool habitus with smaller and harder stools x 4-5 days about 2 weeks ago. He says this issue has resolved however.    This abodminal pain has not caused him to stop eating, yet he says he usually only has small meals.     He notes that 2 days ago he went to his GP for back pain which turned out to likely be kidney stones (had some hematuria) along with UTI. He was given an abx to take q6h for 7 days, yet he cannot name the abx. His abdominal pain started prior to initiating abx however. Additionally his GP had re-started him on omeprazole 20 qAM as he  mentioned the abdominal pain to her as well.    He was previously on Prilosec, however he has not been on PPI for over a year. He has not tried any antacid, etc for this abdominal pain other than taking scheduled Prilosec.    Denies any associated constipation, blood in stool, vomitus, weight loss, appetite change.    REVIEW OF SYSTEMS IS OTHERWISE NEGATIVE.      Historical Information   Past Medical History:   Diagnosis Date   • CAD (coronary artery disease), native coronary artery    • Cancer (HCC)     malignant neoplasm of prostate   • Chronic back pain    • Chronic reflux esophagitis    • COVID-19    • Disease of thyroid gland     nodule of thyroid    • Gastric ulcer    • GERD (gastroesophageal reflux disease)    • Heart murmur     mitral valve regurgitation   • Hx of bleeding disorder     rectal bleeding   • Hyperlipidemia    • Hypertension    • Malignant neoplasm of prostate (HCC)    • Palpitations    • Polyp of colon    • Rectal bleeding    • Sleep apnea    • Thoracic aortic aneurysm (HCC)    • Thyroid nodule      Past Surgical History:   Procedure Laterality Date   • BACK SURGERY     • CARDIAC CATHETERIZATION     • CERVICAL SPINE SURGERY     • COLONOSCOPY     • FOOT SURGERY Bilateral    • HERNIA REPAIR     • HERNIA REPAIR     • [place] COLONOSCOPY FLX DX W/COLLJ SPEC WHEN PFRMD N/A 2/14/2017    Procedure: EGD AND COLONOSCOPY;  Surgeon: Roger Jordan MD;  Location: [facility] GI LAB;  Service: Gastroenterology   • PROSTATE SURGERY     • UPPER GASTROINTESTINAL ENDOSCOPY       Social History   Social History     Substance and Sexual Activity   Alcohol Use No     Social History     Substance and Sexual Activity   Drug Use No     Social History     Tobacco Use   Smoking Status Never   Smokeless Tobacco Never     History reviewed. No pertinent family history.    Meds/Allergies       Current Outpatient Medications:   •  aspirin 81 mg chewable tablet  •  atorvastatin (LIPITOR) 10 mg tablet  •  Cephalexin 500 MG tablet  •   "losartan (COZAAR) 100 MG tablet  •  Multiple Vitamin (THERAPEUTIC MULTIVITAMIN PO)  •  Multiple Vitamins-Minerals (Multivitamin Adult) CHEW  •  omeprazole (PriLOSEC) 20 mg delayed release capsule  •  albuterol (PROAIR HFA) 90 mcg/act inhaler  •  meclizine (ANTIVERT) 25 mg tablet  •  meloxicam (MOBIC) 7.5 mg tablet  •  ondansetron (ZOFRAN) 4 mg tablet  •  SUMAtriptan (IMITREX) 25 mg tablet  •  tadalafil (CIALIS) 5 MG tablet    No Known Allergies        Objective     Blood pressure 130/90, temperature 99.1 °F (37.3 °C), temperature source Tympanic, height 5' 9\" (1.753 m), weight 93 kg (205 lb). Body mass index is 30.27 kg/m².      PHYSICAL EXAM:      General Appearance:   Alert, cooperative, no distress   HEENT:   Normocephalic, atraumatic, anicteric.     Neck:  Supple, symmetrical, trachea midline   Lungs:   Clear to auscultation bilaterally; no rales, rhonchi or wheezing; respirations unlabored    Heart::   Regular rate and rhythm; no murmur, rub, or gallop.   Abdomen:   Soft, non-tender, non-distended; normal bowel sounds; no masses, no organomegaly    Genitalia:   Deferred    Rectal:   Deferred    Extremities:  No cyanosis, clubbing or edema    Pulses:  2+ and symmetric    Skin:  No jaundice, rashes, or lesions    Lymph nodes:  No palpable cervical lymphadenopathy        Lab Results:   No visits with results within 1 Day(s) from this visit.   Latest known visit with results is:   Hospital Outpatient Visit on 05/10/2022   Component Date Value   • Case Report 05/10/2022                      Value:Surgical Pathology Report                         Case: [id number]                                   Authorizing Provider:  Roger Jordan MD           Collected:           05/10/2022 1006              Ordering Location:     [facility]        Received:            05/10/2022 [address] [place] [address] Endoscopy                                                     Pathologist:           Katrina" MD Zunilda                                                                  Specimens:   A) - Large Intestine, Sigmoid Colon, sigmoid colon cold snare                                       B) - Rectum, rectal polyp cold snare                                                      • Final Diagnosis 05/10/2022                      Value:This result contains rich text formatting which cannot be displayed here.   • Additional Information 05/10/2022                      Value:This result contains rich text formatting which cannot be displayed here.   • Synoptic Checklist 05/10/2022                      Value:                            COLON/RECTUM POLYP FORM - GI - All Specimens                                                                                     :    Adenoma(s)     • Gross Description 05/10/2022                      Value:This result contains rich text formatting which cannot be displayed here.         Radiology Results:   No results found.

## 2024-01-31 ENCOUNTER — NURSE TRIAGE (OUTPATIENT)
Age: 74
End: 2024-01-31

## 2024-01-31 NOTE — TELEPHONE ENCOUNTER
Regarding: stomach pain , not feeling better  ----- Message from Merle Person sent at 1/30/2024  4:04 PM EST -----  Pt called in saying he has been taking the prilosec and has his gastric emptying test on 3/14/24 but is wondering what he should be doing until then as his stomach pain has gotten worse. The doctor told him to call in if he was not improving or getting worse and the pt said he is feeling a little worse. Please reach back out to the pt

## 2024-01-31 NOTE — TELEPHONE ENCOUNTER
Last ov 1/11/24 Dr. Norwood/Dr. Jordan, Procedure colon 5/10/22, Scheduled for GES 03/14/24, current routine f/u 9/6/24    Patient called in as instructed at last office visit to report in if symptoms worsening. He is noting increasing abdominal pain, occurring daily above umbilicus. Patient is taking PPI as instructed, scheduled the GES, following reflux diet, elevates head of bed, does not eat late in evening. I advised I would forward to provider for review/plan. I did mention that PPI may be incrased to twice a day but since not mentioned in last office visit note I advised waiting to receive provider reply.       Reason for Disposition   The patient has reflux    Answer Questions - Initial Assessment - Gerd New  When did your symptoms start: ongoing since visit    How often is this occurring: lately daily     What medication are you currently taking: omprazole in the morning    Have you tried any OTC medications:No    What was the outcome of taking the medication for this symptom: worsened    Any recent changes in your bowel habits: no change, normal bowel movements, no blood    Any new life stressors or diet changes: no    Anything that makes this better: not currently    Have you had any recent blood work, imaging, or procedures: rental ultrasound    Protocols used: GERD

## 2024-02-14 ENCOUNTER — NURSE TRIAGE (OUTPATIENT)
Age: 74
End: 2024-02-14

## 2024-02-14 NOTE — TELEPHONE ENCOUNTER
"Last ov 1/11/24 Dr. Jordan  Procedure colon 5/10/22    Patient is scheduled for GES in March, he is noting abdominal pain starting flank area radiating to below umbilicus, it could be severe at times. This has been occurring for past few weeks and he had an episode this morning where he was doubled over. It has subsided but he still has lingering discomfort. There is no correlation with meals, activity, bowel movements. MR. Dyer did take the omeprazole as ordered for 2-3 weeks and abdominal pain continued. He is currently not on the PPI. Patient requesting testing be ordered to evaluate symptoms (labs/CT) but he is willing to report to ER if you feel it is necessary.      Reason for Disposition   Abdominal pain is a chronic symptom (recurrent or ongoing AND lasting > 4 weeks)    Answer Assessment - Initial Assessment Questions  1. LOCATION: \"Where does it hurt?\"       Flank area to bottom of belly and right now below belly button left  2. RADIATION: \"Does the pain shoot anywhere else?\" (e.g., chest, back)      See above  3. ONSET: \"When did the pain begin?\" (Minutes, hours or days ago)       Started a few weeks ago   4. SUDDEN: \"Gradual or sudden onset?\"      This has been happening but this morning worse than before but subsided currently  5. PATTERN \"Does the pain come and go, or is it constant?\"     - If constant: \"Is it getting better, staying the same, or worsening?\"       (Note: Constant means the pain never goes away completely; most serious pain is constant and it progresses)      - If intermittent: \"How long does it last?\" \"Do you have pain now?\"      (Note: Intermittent means the pain goes away completely between bouts)      Intermittent   6. SEVERITY: \"How bad is the pain?\"  (e.g., Scale 1-10; mild, moderate, or severe)     - MILD (1-3): doesn't interfere with normal activities, abdomen soft and not tender to touch      - MODERATE (4-7): interferes with normal activities or awakens from sleep, tender " "to touch      - SEVERE (8-10): excruciating pain, doubled over, unable to do any normal activities        Has been severe when it occurs can last for a while and decreases  7. RECURRENT SYMPTOM: \"Have you ever had this type of stomach pain before?\" If Yes, ask: \"When was the last time?\" and \"What happened that time?\"       yes  8. CAUSE: \"What do you think is causing the stomach pain?\"      unknown  9. RELIEVING/AGGRAVATING FACTORS: \"What makes it better or worse?\" (e.g., movement, antacids, bowel movement)      Nothing at this time, it just will lessen   10. OTHER SYMPTOMS: \"Has there been any vomiting, diarrhea, constipation, or urine problems?\"        Regular bowel movements, watches what he eats, keeps hydrated    Protocols used: Abdominal Pain - Male-ADULT-[place]    "

## 2024-02-14 NOTE — TELEPHONE ENCOUNTER
Regarding: horrible abdominal pain  ----- Message from Mely August sent at 2/14/2024 10:45 AM EST -----  Patient called and stated he is having horrible abdominal pain and requested to see if he can have a ct scan and blood work please reach out to review symptoms thank you

## 2024-02-24 DIAGNOSIS — R10.84 GENERALIZED ABDOMINAL PAIN: Primary | ICD-10-CM

## 2024-03-02 ENCOUNTER — LAB (OUTPATIENT)
Dept: LAB | Facility: HOSPITAL | Age: 74
End: 2024-03-02
Payer: COMMERCIAL

## 2024-03-02 DIAGNOSIS — R10.84 GENERALIZED ABDOMINAL PAIN: ICD-10-CM

## 2024-03-02 LAB
ALBUMIN SERPL BCP-MCNC: 4.5 G/DL (ref 3.5–5)
ALP SERPL-CCNC: 84 U/L (ref 34–104)
ALT SERPL W P-5'-P-CCNC: 19 U/L (ref 7–52)
ANION GAP SERPL CALCULATED.3IONS-SCNC: 5 MMOL/L
AST SERPL W P-5'-P-CCNC: 20 U/L (ref 13–39)
BILIRUB SERPL-MCNC: 0.83 MG/DL (ref 0.2–1)
BUN SERPL-MCNC: 15 MG/DL (ref 5–25)
CALCIUM SERPL-MCNC: 9.6 MG/DL (ref 8.4–10.2)
CHLORIDE SERPL-SCNC: 105 MMOL/L (ref 96–108)
CO2 SERPL-SCNC: 28 MMOL/L (ref 21–32)
CREAT SERPL-MCNC: 0.92 MG/DL (ref 0.6–1.3)
ERYTHROCYTE [DISTWIDTH] IN BLOOD BY AUTOMATED COUNT: 13.4 % (ref 11.6–15.1)
GFR SERPL CREATININE-BSD FRML MDRD: 82 ML/MIN/1.73SQ M
GLUCOSE P FAST SERPL-MCNC: 101 MG/DL (ref 65–99)
HCT VFR BLD AUTO: 45.2 % (ref 36.5–49.3)
HGB BLD-MCNC: 15 G/DL (ref 12–17)
LIPASE SERPL-CCNC: 33 U/L (ref 11–82)
MCH RBC QN AUTO: 29.8 PG (ref 26.8–34.3)
MCHC RBC AUTO-ENTMCNC: 33.2 G/DL (ref 31.4–37.4)
MCV RBC AUTO: 90 FL (ref 82–98)
PLATELET # BLD AUTO: 190 THOUSANDS/UL (ref 149–390)
PMV BLD AUTO: 9.6 FL (ref 8.9–12.7)
POTASSIUM SERPL-SCNC: 4.9 MMOL/L (ref 3.5–5.3)
PROT SERPL-MCNC: 7 G/DL (ref 6.4–8.4)
RBC # BLD AUTO: 5.04 MILLION/UL (ref 3.88–5.62)
SODIUM SERPL-SCNC: 138 MMOL/L (ref 135–147)
WBC # BLD AUTO: 6.26 THOUSAND/UL (ref 4.31–10.16)

## 2024-03-02 PROCEDURE — 85027 COMPLETE CBC AUTOMATED: CPT

## 2024-03-02 PROCEDURE — 80053 COMPREHEN METABOLIC PANEL: CPT

## 2024-03-02 PROCEDURE — 36415 COLL VENOUS BLD VENIPUNCTURE: CPT

## 2024-03-02 PROCEDURE — 83690 ASSAY OF LIPASE: CPT

## 2024-03-08 ENCOUNTER — HOSPITAL ENCOUNTER (OUTPATIENT)
Dept: CT IMAGING | Facility: HOSPITAL | Age: 74
Discharge: HOME | End: 2024-03-08
Attending: INTERNAL MEDICINE
Payer: COMMERCIAL

## 2024-03-08 DIAGNOSIS — R10.84 GENERALIZED ABDOMINAL PAIN: ICD-10-CM

## 2024-03-08 PROCEDURE — 74177 CT ABD & PELVIS W/CONTRAST: CPT

## 2024-03-08 RX ADMIN — IOHEXOL 100 ML: 350 INJECTION, SOLUTION INTRAVENOUS at 17:03

## 2024-06-20 ENCOUNTER — TELEPHONE (OUTPATIENT)
Dept: GASTROENTEROLOGY | Facility: CLINIC | Age: 74
End: 2024-06-20

## 2024-06-20 NOTE — TELEPHONE ENCOUNTER
I called and lvm for the patient informing them that their appointment with Dr. Jordan on 9/6 will need to be rescheduled since he will not be in.

## 2024-07-08 ENCOUNTER — TELEPHONE (OUTPATIENT)
Age: 74
End: 2024-07-08

## 2024-07-08 NOTE — TELEPHONE ENCOUNTER
Patients GI provider:  Dr. Jordan    Number to return call: [phone]    Reason for call: Jazmin White from [employer] is trying to reach pt.    Scheduled procedure/appointment date if applicable: 7/23/2024

## 2024-07-23 ENCOUNTER — OFFICE VISIT (OUTPATIENT)
Age: 74
End: 2024-07-23
Payer: COMMERCIAL

## 2024-07-23 VITALS
WEIGHT: 212 LBS | OXYGEN SATURATION: 97 % | HEIGHT: 69 IN | HEART RATE: 80 BPM | RESPIRATION RATE: 18 BRPM | BODY MASS INDEX: 31.4 KG/M2

## 2024-07-23 DIAGNOSIS — K21.9 GASTROESOPHAGEAL REFLUX DISEASE WITHOUT ESOPHAGITIS: Primary | ICD-10-CM

## 2024-07-23 DIAGNOSIS — K62.5 RECTAL BLEEDING: ICD-10-CM

## 2024-07-23 DIAGNOSIS — Z86.010 HISTORY OF COLONIC POLYPS: ICD-10-CM

## 2024-07-23 PROCEDURE — 99214 OFFICE O/P EST MOD 30 MIN: CPT | Performed by: INTERNAL MEDICINE

## 2024-07-23 NOTE — PROGRESS NOTES
[facility] - Outpatient Follow-up Note  Riki Dyer 74 y.o. male MRN: [id number]  Encounter: [id number]          ASSESSMENT AND PLAN:       1. Gastroesophageal reflux disease without esophagitis  He has reflux symptoms so I encouraged him to take the omeprazole daily.  I explained that omeprazole is not a cure and that it is it treatment for the condition of reflux and if he stops taking it the reflux is likely to recur.  I will have him continue this daily for three months and give him a handout detailing lifestyle and dietary modifications that should help his symptoms.  He can continue to take omeprazole as needed.     2. Rectal bleeding  3. History of colonic polyps  I encouraged him to take prune juice daily for his constipation as this could help his bleeding as well.  His bleeding is likely due to hemorrhoids due to straining.  He can adjust the MiraLax dose as needed.     ______________________________________________________________________     SUBJECTIVE:  He presents for follow-up after his colonoscopy in 2022.  He had a colon adenoma removed and repeat colonoscopy was recommended in five years.  He has had intermittent constipation and sometimes when he is straining he has rectal bleeding.  The rectal bleeding is mainly when he is straining with hard bowel movements.  He has been taking prune juice for his constipation and he feels it is much better now.  He denies diarrhea, abdominal pain, and weight loss.  He feels his reflux symptoms have remained intermittent.  He has been taking omeprazole daily and he feels it is helping.  He denies any dysphagia, nausea, or vomiting.      REVIEW OF SYSTEMS IS OTHERWISE NEGATIVE.      Historical Information   Past Medical History:   Diagnosis Date    CAD (coronary artery disease), native coronary artery     Cancer (HCC)     malignant neoplasm of prostate    Chronic back pain     Chronic reflux esophagitis     COVID-19     Disease of  "thyroid gland     nodule of thyroid     Gastric ulcer     GERD (gastroesophageal reflux disease)     Heart murmur     mitral valve regurgitation    Hx of bleeding disorder     rectal bleeding    Hyperlipidemia     Hypertension     Malignant neoplasm of prostate (HCC)     Palpitations     Polyp of colon     Rectal bleeding     Sleep apnea     Thoracic aortic aneurysm (HCC)     Thyroid nodule      Past Surgical History:   Procedure Laterality Date    BACK SURGERY      CARDIAC CATHETERIZATION      CERVICAL SPINE SURGERY      COLONOSCOPY      FOOT SURGERY Bilateral     HERNIA REPAIR      HERNIA REPAIR      VA COLONOSCOPY FLX DX W/COLLJ SPEC WHEN PFRMD N/A 2/14/2017    Procedure: EGD AND COLONOSCOPY;  Surgeon: Roger Jordan MD;  Location: [facility] GI [facility];  Service: Gastroenterology    PROSTATE SURGERY      UPPER GASTROINTESTINAL ENDOSCOPY       Social History   Social History     Substance and Sexual Activity   Alcohol Use No     Social History     Substance and Sexual Activity   Drug Use No     Social History     Tobacco Use   Smoking Status Never   Smokeless Tobacco Never     History reviewed. No pertinent family history.    Meds/Allergies       Current Outpatient Medications:     aspirin 81 mg chewable tablet    atorvastatin (LIPITOR) 10 mg tablet    losartan (COZAAR) 100 MG tablet    Multiple Vitamin (THERAPEUTIC MULTIVITAMIN PO)    Multiple Vitamins-Minerals (Multivitamin Adult) CHEW    omeprazole (PriLOSEC) 20 mg delayed release capsule    albuterol (PROAIR HFA) 90 mcg/act inhaler    Cephalexin 500 MG tablet    meclizine (ANTIVERT) 25 mg tablet    meloxicam (MOBIC) 7.5 mg tablet    ondansetron (ZOFRAN) 4 mg tablet    SUMAtriptan (IMITREX) 25 mg tablet    tadalafil (CIALIS) 5 MG tablet    No Known Allergies        Objective     Pulse 80, resp. rate 18, height 5' 9\" (1.753 m), weight 96.2 kg (212 lb), SpO2 97%. Body mass index is 31.31 kg/m².      PHYSICAL EXAM:      General Appearance:   Alert, cooperative, no " distress   HEENT:   Normocephalic, atraumatic, anicteric.     Neck:  Supple, symmetrical, trachea midline   Lungs:   Clear to auscultation bilaterally; no rales, rhonchi or wheezing; respirations unlabored    Heart::   Regular rate and rhythm; no murmur, rub, or gallop.   Abdomen:   Soft, non-tender, non-distended; normal bowel sounds; no masses, no organomegaly    Genitalia:   Deferred    Rectal:   Deferred    Extremities:  No cyanosis, clubbing or edema    Pulses:  2+ and symmetric    Skin:  No jaundice, rashes, or lesions    Lymph nodes:  No palpable cervical lymphadenopathy        Lab Results:   No visits with results within 1 Day(s) from this visit.   Latest known visit with results is:   Lab on 03/02/2024   Component Date Value    WBC 03/02/2024 6.26     RBC 03/02/2024 5.04     Hemoglobin 03/02/2024 15.0     Hematocrit 03/02/2024 45.2     MCV 03/02/2024 90     MCH 03/02/2024 29.8     MCHC 03/02/2024 33.2     RDW 03/02/2024 13.4     Platelets 03/02/2024 190     MPV 03/02/2024 9.6     Sodium 03/02/2024 138     Potassium 03/02/2024 4.9     Chloride 03/02/2024 105     CO2 03/02/2024 28     ANION GAP 03/02/2024 5     BUN 03/02/2024 15     Creatinine 03/02/2024 0.92     Glucose, Fasting 03/02/2024 101 (H)     Calcium 03/02/2024 9.6     AST 03/02/2024 20     ALT 03/02/2024 19     Alkaline Phosphatase 03/02/2024 84     Total Protein 03/02/2024 7.0     Albumin 03/02/2024 4.5     Total Bilirubin 03/02/2024 0.83     eGFR 03/02/2024 82     Lipase 03/02/2024 33          Radiology Results:   No results found.

## 2024-07-23 NOTE — LETTER
July 23, 2024     Riki Jolley MD  [address] [facility] [address]    Patient: Riki Dyer   YOB: 1950   Date of Visit: 7/23/2024       Dear Dr. Jolley:    Thank you for referring Riki Dyer to me for evaluation. Below are my notes for this consultation.    If you have questions, please do not hesitate to call me. I look forward to following your patient along with you.         Sincerely,        Roger Jordan MD        CC: No Recipients    Roger Jordan MD  7/23/2024 12:22 PM  Sign when Signing Visit  [facility] - Outpatient Follow-up Note  Riki Dyer 74 y.o. male MRN: [id number]  Encounter: [id number]          ASSESSMENT AND PLAN:       1. Gastroesophageal reflux disease without esophagitis  He has reflux symptoms so I encouraged him to take the omeprazole daily.  I explained that omeprazole is not a cure and that it is it treatment for the condition of reflux and if he stops taking it the reflux is likely to recur.  I will have him continue this daily for three months and give him a handout detailing lifestyle and dietary modifications that should help his symptoms.  He can continue to take omeprazole as needed.     2. Rectal bleeding  3. History of colonic polyps  I encouraged him to take prune juice daily for his constipation as this could help his bleeding as well.  His bleeding is likely due to hemorrhoids due to straining.  He can adjust the MiraLax dose as needed.     ______________________________________________________________________     SUBJECTIVE:  He presents for follow-up after his colonoscopy in 2022.  He had a colon adenoma removed and repeat colonoscopy was recommended in five years.  He has had intermittent constipation and sometimes when he is straining he has rectal bleeding.  The rectal bleeding is mainly when he is straining with hard bowel movements.  He has been taking prune juice for his constipation and he feels it is much better now.   He denies diarrhea, abdominal pain, and weight loss.  He feels his reflux symptoms have remained intermittent.  He has been taking omeprazole daily and he feels it is helping.  He denies any dysphagia, nausea, or vomiting.      REVIEW OF SYSTEMS IS OTHERWISE NEGATIVE.      Historical Information  Past Medical History:   Diagnosis Date   • CAD (coronary artery disease), native coronary artery    • Cancer (HCC)     malignant neoplasm of prostate   • Chronic back pain    • Chronic reflux esophagitis    • COVID-19    • Disease of thyroid gland     nodule of thyroid    • Gastric ulcer    • GERD (gastroesophageal reflux disease)    • Heart murmur     mitral valve regurgitation   • Hx of bleeding disorder     rectal bleeding   • Hyperlipidemia    • Hypertension    • Malignant neoplasm of prostate (HCC)    • Palpitations    • Polyp of colon    • Rectal bleeding    • Sleep apnea    • Thoracic aortic aneurysm (HCC)    • Thyroid nodule      Past Surgical History:   Procedure Laterality Date   • BACK SURGERY     • CARDIAC CATHETERIZATION     • CERVICAL SPINE SURGERY     • COLONOSCOPY     • FOOT SURGERY Bilateral    • HERNIA REPAIR     • HERNIA REPAIR     • [place] COLONOSCOPY FLX DX W/COLLJ SPEC WHEN PFRMD N/A 2/14/2017    Procedure: EGD AND COLONOSCOPY;  Surgeon: Roger Jordan MD;  Location: [facility] GI LAB;  Service: Gastroenterology   • PROSTATE SURGERY     • UPPER GASTROINTESTINAL ENDOSCOPY       Social History  Social History     Substance and Sexual Activity   Alcohol Use No     Social History     Substance and Sexual Activity   Drug Use No     Social History     Tobacco Use   Smoking Status Never   Smokeless Tobacco Never     History reviewed. No pertinent family history.    Meds/Allergies      Current Outpatient Medications:   •  aspirin 81 mg chewable tablet  •  atorvastatin (LIPITOR) 10 mg tablet  •  losartan (COZAAR) 100 MG tablet  •  Multiple Vitamin (THERAPEUTIC MULTIVITAMIN PO)  •  Multiple Vitamins-Minerals  "(Multivitamin Adult) CHEW  •  omeprazole (PriLOSEC) 20 mg delayed release capsule  •  albuterol (PROAIR HFA) 90 mcg/act inhaler  •  Cephalexin 500 MG tablet  •  meclizine (ANTIVERT) 25 mg tablet  •  meloxicam (MOBIC) 7.5 mg tablet  •  ondansetron (ZOFRAN) 4 mg tablet  •  SUMAtriptan (IMITREX) 25 mg tablet  •  tadalafil (CIALIS) 5 MG tablet    No Known Allergies        Objective    Pulse 80, resp. rate 18, height 5' 9\" (1.753 m), weight 96.2 kg (212 lb), SpO2 97%. Body mass index is 31.31 kg/m².      PHYSICAL EXAM:      General Appearance:   Alert, cooperative, no distress   HEENT:   Normocephalic, atraumatic, anicteric.     Neck:  Supple, symmetrical, trachea midline   Lungs:   Clear to auscultation bilaterally; no rales, rhonchi or wheezing; respirations unlabored    Heart::   Regular rate and rhythm; no murmur, rub, or gallop.   Abdomen:   Soft, non-tender, non-distended; normal bowel sounds; no masses, no organomegaly    Genitalia:   Deferred    Rectal:   Deferred    Extremities:  No cyanosis, clubbing or edema    Pulses:  2+ and symmetric    Skin:  No jaundice, rashes, or lesions    Lymph nodes:  No palpable cervical lymphadenopathy        Lab Results:   No visits with results within 1 Day(s) from this visit.   Latest known visit with results is:   Lab on 03/02/2024   Component Date Value   • WBC 03/02/2024 6.26    • RBC 03/02/2024 5.04    • Hemoglobin 03/02/2024 15.0    • Hematocrit 03/02/2024 45.2    • MCV 03/02/2024 90    • MCH 03/02/2024 29.8    • MCHC 03/02/2024 33.2    • RDW 03/02/2024 13.4    • Platelets 03/02/2024 190    • MPV 03/02/2024 9.6    • Sodium 03/02/2024 138    • Potassium 03/02/2024 4.9    • Chloride 03/02/2024 105    • CO2 03/02/2024 28    • ANION GAP 03/02/2024 5    • BUN 03/02/2024 15    • Creatinine 03/02/2024 0.92    • Glucose, Fasting 03/02/2024 101 (H)    • Calcium 03/02/2024 9.6    • AST 03/02/2024 20    • ALT 03/02/2024 19    • Alkaline Phosphatase 03/02/2024 84    • Total Protein " 03/02/2024 7.0    • Albumin 03/02/2024 4.5    • Total Bilirubin 03/02/2024 0.83    • eGFR 03/02/2024 82    • Lipase 03/02/2024 33          Radiology Results:   No results found.

## 2024-10-08 ENCOUNTER — NURSE TRIAGE (OUTPATIENT)
Dept: GASTROENTEROLOGY | Facility: CLINIC | Age: 74
End: 2024-10-08

## 2024-10-08 NOTE — TELEPHONE ENCOUNTER
Patient c/o rectal bleeding with hard stools.  Patient having to do a lot of straining.  Patient has hx of hemorrhoids.  Patient states having bright red blood with on toilet paper and in toilet on stools.  Advised patient that most likely hemorrhoids and to try prep H.  Advised patient to take colace and miralax as well to help with constipation.  Patient states understanding.        Reason for Disposition   Affirmative: The patient has constipation with BRBPR (bright red blood per rectum)    Answer Assessment - Initial Assessment Questions  1. When did your symptoms start?   About 1 week  2. Is there bright red blood when wiping or streaks in the stool? If yes, how many occurrences have you had in the last 24 hours?  Bright red bleeding on toilet paper and in toilet.   3. Do you feel this is a mild, moderate, or severe amount of blood?   mild  4. Have your symptoms improved or weakened?   same  5. Are you experiencing more diarrhea or constipation?   Constipation    6. Do you have anything prescribed or over the counter for this? XXX If so, did they offer any relief?   denies  7. Are you experiencing any additional symptoms? If yes, please describe what your symptoms are.   Constipation    Protocols used: GI-Hemorrhoids-ADULT-[place]

## 2024-10-08 NOTE — TELEPHONE ENCOUNTER
Pt returned call in regard to scheduling appointment with Dr Jordan for rectal bleeding. Call was transferred to the nurse to assist pt.

## 2024-10-08 NOTE — TELEPHONE ENCOUNTER
Patients GI provider:  Dr. Jordan    Number to return call: [phone]    Reason for call: Pt calling with c/o rectal bleeding. Requesting an apt with Dr. Jordan in any location, nothing available until Jan. Please advise.     Scheduled procedure/appointment date if applicable: none